# Patient Record
Sex: MALE | Race: WHITE | NOT HISPANIC OR LATINO | Employment: FULL TIME | URBAN - METROPOLITAN AREA
[De-identification: names, ages, dates, MRNs, and addresses within clinical notes are randomized per-mention and may not be internally consistent; named-entity substitution may affect disease eponyms.]

---

## 2021-04-22 DIAGNOSIS — Z00.00 ROUTINE GENERAL MEDICAL EXAMINATION AT A HEALTH CARE FACILITY: Primary | ICD-10-CM

## 2021-04-22 DIAGNOSIS — Z91.89 AT RISK FOR CORONARY ARTERY DISEASE: ICD-10-CM

## 2021-05-07 DIAGNOSIS — Z00.00 ROUTINE GENERAL MEDICAL EXAMINATION AT A HEALTH CARE FACILITY: ICD-10-CM

## 2021-05-07 DIAGNOSIS — Z91.89 AT RISK FOR CORONARY ARTERY DISEASE: Primary | ICD-10-CM

## 2021-05-12 ENCOUNTER — OFFICE VISIT (OUTPATIENT)
Dept: INTERNAL MEDICINE | Facility: CLINIC | Age: 41
End: 2021-05-12

## 2021-05-12 ENCOUNTER — HOSPITAL ENCOUNTER (OUTPATIENT)
Dept: RADIOLOGY | Facility: HOSPITAL | Age: 41
Discharge: HOME | End: 2021-05-12
Attending: FAMILY MEDICINE

## 2021-05-12 ENCOUNTER — HOSPITAL ENCOUNTER (OUTPATIENT)
Dept: CARDIOLOGY | Facility: HOSPITAL | Age: 41
Discharge: HOME | End: 2021-05-12
Attending: FAMILY MEDICINE

## 2021-05-12 ENCOUNTER — CLINICAL SUPPORT (OUTPATIENT)
Dept: AUDIOLOGY | Facility: CLINIC | Age: 41
End: 2021-05-12

## 2021-05-12 ENCOUNTER — CLINICAL SUPPORT (OUTPATIENT)
Dept: INTERNAL MEDICINE | Facility: CLINIC | Age: 41
End: 2021-05-12

## 2021-05-12 ENCOUNTER — CLINICAL SUPPORT (OUTPATIENT)
Dept: INTERNAL MEDICINE | Facility: CLINIC | Age: 41
End: 2021-05-12
Payer: COMMERCIAL

## 2021-05-12 ENCOUNTER — CLINICAL SUPPORT (OUTPATIENT)
Dept: REHABILITATION | Facility: HOSPITAL | Age: 41
End: 2021-05-12

## 2021-05-12 VITALS
DIASTOLIC BLOOD PRESSURE: 82 MMHG | SYSTOLIC BLOOD PRESSURE: 128 MMHG | RESPIRATION RATE: 16 BRPM | BODY MASS INDEX: 25.25 KG/M2 | HEIGHT: 70 IN | WEIGHT: 176.38 LBS | HEART RATE: 55 BPM

## 2021-05-12 DIAGNOSIS — Z01.12 HEARING CONSERVATION AND TREATMENT EXAM: Primary | ICD-10-CM

## 2021-05-12 DIAGNOSIS — Z00.00 ROUTINE GENERAL MEDICAL EXAMINATION AT A HEALTH CARE FACILITY: Primary | ICD-10-CM

## 2021-05-12 DIAGNOSIS — Z00.00 ROUTINE GENERAL MEDICAL EXAMINATION AT A HEALTH CARE FACILITY: ICD-10-CM

## 2021-05-12 DIAGNOSIS — M1A.0790 IDIOPATHIC CHRONIC GOUT OF FOOT WITHOUT TOPHUS, UNSPECIFIED LATERALITY: ICD-10-CM

## 2021-05-12 DIAGNOSIS — Z91.89 AT RISK FOR CORONARY ARTERY DISEASE: ICD-10-CM

## 2021-05-12 LAB
ALBUMIN SERPL BCP-MCNC: 3.9 G/DL (ref 3.5–5.2)
ALP SERPL-CCNC: 63 U/L (ref 55–135)
ALT SERPL W/O P-5'-P-CCNC: 22 U/L (ref 10–44)
ANION GAP SERPL CALC-SCNC: 10 MMOL/L (ref 8–16)
AST SERPL-CCNC: 27 U/L (ref 10–40)
BILIRUB SERPL-MCNC: 0.9 MG/DL (ref 0.1–1)
BILIRUB UR QL STRIP: NEGATIVE
BUN SERPL-MCNC: 15 MG/DL (ref 6–20)
CALCIUM SERPL-MCNC: 9.2 MG/DL (ref 8.7–10.5)
CHLORIDE SERPL-SCNC: 107 MMOL/L (ref 95–110)
CHOLEST SERPL-MCNC: 216 MG/DL (ref 120–199)
CHOLEST/HDLC SERPL: 4.1 {RATIO} (ref 2–5)
CLARITY UR: CLEAR
CO2 SERPL-SCNC: 25 MMOL/L (ref 23–29)
COLOR UR: YELLOW
CREAT SERPL-MCNC: 1.5 MG/DL (ref 0.5–1.4)
CRP SERPL-MCNC: 0.4 MG/L (ref 0–8.2)
CV STRESS BASE HR: 59 BPM
DIASTOLIC BLOOD PRESSURE: 85 MMHG
ERYTHROCYTE [DISTWIDTH] IN BLOOD BY AUTOMATED COUNT: 13.1 % (ref 11.5–14.5)
EST. GFR  (AFRICAN AMERICAN): >60 ML/MIN/1.73 M^2
EST. GFR  (NON AFRICAN AMERICAN): 57 ML/MIN/1.73 M^2
ESTIMATED AVG GLUCOSE: 97 MG/DL (ref 68–131)
GLUCOSE SERPL-MCNC: 92 MG/DL (ref 70–110)
GLUCOSE UR QL STRIP: NEGATIVE
HBA1C MFR BLD: 5 % (ref 4–5.6)
HCT VFR BLD AUTO: 46.2 % (ref 40–54)
HDLC SERPL-MCNC: 53 MG/DL (ref 40–75)
HDLC SERPL: 24.5 % (ref 20–50)
HGB BLD-MCNC: 16.4 G/DL (ref 14–18)
HGB UR QL STRIP: NEGATIVE
KETONES UR QL STRIP: NEGATIVE
LDLC SERPL CALC-MCNC: 134.6 MG/DL (ref 63–159)
LEUKOCYTE ESTERASE UR QL STRIP: NEGATIVE
MAGNESIUM SERPL-MCNC: 1.9 MG/DL (ref 1.6–2.6)
MCH RBC QN AUTO: 32.5 PG (ref 27–31)
MCHC RBC AUTO-ENTMCNC: 35.5 G/DL (ref 32–36)
MCV RBC AUTO: 92 FL (ref 82–98)
NITRITE UR QL STRIP: NEGATIVE
NONHDLC SERPL-MCNC: 163 MG/DL
OHS CV CPX 1 MINUTE RECOVERY HEART RATE: 131 BPM
OHS CV CPX 85 PERCENT MAX PREDICTED HEART RATE MALE: 153
OHS CV CPX ESTIMATED METS: 13
OHS CV CPX MAX PREDICTED HEART RATE: 180
OHS CV CPX PATIENT IS FEMALE: 0
OHS CV CPX PATIENT IS MALE: 1
OHS CV CPX PEAK DIASTOLIC BLOOD PRESSURE: 79 MMHG
OHS CV CPX PEAK HEAR RATE: 157 BPM
OHS CV CPX PEAK RATE PRESSURE PRODUCT: NORMAL
OHS CV CPX PEAK SYSTOLIC BLOOD PRESSURE: 244 MMHG
OHS CV CPX PERCENT MAX PREDICTED HEART RATE ACHIEVED: 87
OHS CV CPX RATE PRESSURE PRODUCT PRESENTING: 6962
PH UR STRIP: 6 [PH] (ref 5–8)
PLATELET # BLD AUTO: 201 K/UL (ref 150–450)
PMV BLD AUTO: 10.1 FL (ref 9.2–12.9)
POTASSIUM SERPL-SCNC: 4.9 MMOL/L (ref 3.5–5.1)
PROT SERPL-MCNC: 7 G/DL (ref 6–8.4)
PROT UR QL STRIP: NEGATIVE
RBC # BLD AUTO: 5.05 M/UL (ref 4.6–6.2)
SODIUM SERPL-SCNC: 142 MMOL/L (ref 136–145)
SP GR UR STRIP: 1.02 (ref 1–1.03)
STRESS ECHO POST EXERCISE DUR MIN: 12 MINUTES
SYSTOLIC BLOOD PRESSURE: 118 MMHG
TRIGL SERPL-MCNC: 142 MG/DL (ref 30–150)
TSH SERPL DL<=0.005 MIU/L-ACNC: 1.38 UIU/ML (ref 0.4–4)
URATE SERPL-MCNC: 10.4 MG/DL (ref 3.4–7)
URN SPEC COLLECT METH UR: NORMAL
WBC # BLD AUTO: 4.99 K/UL (ref 3.9–12.7)

## 2021-05-12 PROCEDURE — 97802 PR MED NUTR THER, 1ST, INDIV, EA 15 MIN: ICD-10-PCS | Mod: S$GLB,,, | Performed by: INTERNAL MEDICINE

## 2021-05-12 PROCEDURE — 93016 EXERCISE STRESS - EKG (CUPID ONLY): ICD-10-PCS | Mod: ,,, | Performed by: INTERNAL MEDICINE

## 2021-05-12 PROCEDURE — 86140 C-REACTIVE PROTEIN: CPT | Performed by: FAMILY MEDICINE

## 2021-05-12 PROCEDURE — 82977 ASSAY OF GGT: CPT | Performed by: FAMILY MEDICINE

## 2021-05-12 PROCEDURE — 80061 LIPID PANEL: CPT | Performed by: FAMILY MEDICINE

## 2021-05-12 PROCEDURE — 99999 PR PBB SHADOW E&M-EST. PATIENT-LVL II: CPT | Mod: PBBFAC,,, | Performed by: FAMILY MEDICINE

## 2021-05-12 PROCEDURE — 83036 HEMOGLOBIN GLYCOSYLATED A1C: CPT | Performed by: FAMILY MEDICINE

## 2021-05-12 PROCEDURE — 85027 COMPLETE CBC AUTOMATED: CPT | Performed by: FAMILY MEDICINE

## 2021-05-12 PROCEDURE — 71046 XR CHEST PA AND LATERAL: ICD-10-PCS | Mod: 26,,, | Performed by: RADIOLOGY

## 2021-05-12 PROCEDURE — 82728 ASSAY OF FERRITIN: CPT | Performed by: FAMILY MEDICINE

## 2021-05-12 PROCEDURE — 86703 HIV-1/HIV-2 1 RESULT ANTBDY: CPT | Performed by: FAMILY MEDICINE

## 2021-05-12 PROCEDURE — 82607 VITAMIN B-12: CPT | Performed by: FAMILY MEDICINE

## 2021-05-12 PROCEDURE — 80053 COMPREHEN METABOLIC PANEL: CPT | Performed by: FAMILY MEDICINE

## 2021-05-12 PROCEDURE — 75571 CT CALCIUM SCORING CARDIAC: ICD-10-PCS | Mod: 26,,, | Performed by: RADIOLOGY

## 2021-05-12 PROCEDURE — 92552 PURE TONE AUDIOMETRY AIR: CPT | Mod: S$GLB,,, | Performed by: AUDIOLOGIST-HEARING AID FITTER

## 2021-05-12 PROCEDURE — 84443 ASSAY THYROID STIM HORMONE: CPT | Performed by: FAMILY MEDICINE

## 2021-05-12 PROCEDURE — 92552 PR PURE TONE AUDIOMETRY, AIR: ICD-10-PCS | Mod: S$GLB,,, | Performed by: AUDIOLOGIST-HEARING AID FITTER

## 2021-05-12 PROCEDURE — 82172 ASSAY OF APOLIPOPROTEIN: CPT | Performed by: FAMILY MEDICINE

## 2021-05-12 PROCEDURE — 83695 ASSAY OF LIPOPROTEIN(A): CPT | Performed by: FAMILY MEDICINE

## 2021-05-12 PROCEDURE — 71046 X-RAY EXAM CHEST 2 VIEWS: CPT | Mod: 26,,, | Performed by: RADIOLOGY

## 2021-05-12 PROCEDURE — 82040 ASSAY OF SERUM ALBUMIN: CPT | Performed by: FAMILY MEDICINE

## 2021-05-12 PROCEDURE — 99386 PR PREVENTIVE VISIT,NEW,40-64: ICD-10-PCS | Mod: S$GLB,,, | Performed by: FAMILY MEDICINE

## 2021-05-12 PROCEDURE — 82570 ASSAY OF URINE CREATININE: CPT | Performed by: FAMILY MEDICINE

## 2021-05-12 PROCEDURE — 84550 ASSAY OF BLOOD/URIC ACID: CPT | Performed by: FAMILY MEDICINE

## 2021-05-12 PROCEDURE — 83735 ASSAY OF MAGNESIUM: CPT | Performed by: FAMILY MEDICINE

## 2021-05-12 PROCEDURE — 99999 PR PBB SHADOW E&M-EST. PATIENT-LVL II: ICD-10-PCS | Mod: PBBFAC,,, | Performed by: FAMILY MEDICINE

## 2021-05-12 PROCEDURE — 99386 PREV VISIT NEW AGE 40-64: CPT | Mod: S$GLB,,, | Performed by: FAMILY MEDICINE

## 2021-05-12 PROCEDURE — 97802 MEDICAL NUTRITION INDIV IN: CPT | Mod: S$GLB,,, | Performed by: INTERNAL MEDICINE

## 2021-05-12 PROCEDURE — 75571 CT HRT W/O DYE W/CA TEST: CPT | Mod: TC

## 2021-05-12 PROCEDURE — 82306 VITAMIN D 25 HYDROXY: CPT | Performed by: FAMILY MEDICINE

## 2021-05-12 PROCEDURE — 81003 URINALYSIS AUTO W/O SCOPE: CPT | Performed by: FAMILY MEDICINE

## 2021-05-12 PROCEDURE — 71046 X-RAY EXAM CHEST 2 VIEWS: CPT | Mod: TC

## 2021-05-12 PROCEDURE — 93018 EXERCISE STRESS - EKG (CUPID ONLY): ICD-10-PCS | Mod: ,,, | Performed by: INTERNAL MEDICINE

## 2021-05-12 PROCEDURE — 93018 CV STRESS TEST I&R ONLY: CPT | Mod: ,,, | Performed by: INTERNAL MEDICINE

## 2021-05-12 PROCEDURE — 82043 UR ALBUMIN QUANTITATIVE: CPT | Performed by: FAMILY MEDICINE

## 2021-05-12 PROCEDURE — 83540 ASSAY OF IRON: CPT | Performed by: FAMILY MEDICINE

## 2021-05-12 PROCEDURE — 75571 CT HRT W/O DYE W/CA TEST: CPT | Mod: 26,,, | Performed by: RADIOLOGY

## 2021-05-12 PROCEDURE — 93016 CV STRESS TEST SUPVJ ONLY: CPT | Mod: ,,, | Performed by: INTERNAL MEDICINE

## 2021-05-12 PROCEDURE — 97750 PHYSICAL PERFORMANCE TEST: CPT | Performed by: PHYSICAL THERAPIST

## 2021-05-12 RX ORDER — COLCHICINE 0.6 MG/1
TABLET ORAL
Qty: 6 TABLET | Refills: 3 | Status: SHIPPED | OUTPATIENT
Start: 2021-05-12 | End: 2022-11-07 | Stop reason: SDUPTHER

## 2021-05-12 RX ORDER — ALLOPURINOL 300 MG/1
300 TABLET ORAL DAILY
Qty: 90 TABLET | Refills: 3 | Status: SHIPPED | OUTPATIENT
Start: 2021-05-12 | End: 2022-05-12

## 2021-05-13 LAB
25(OH)D3+25(OH)D2 SERPL-MCNC: 19 NG/ML (ref 30–96)
ALBUMIN SERPL BCP-MCNC: 3.9 G/DL (ref 3.5–5.2)
ALBUMIN/CREAT UR: 9.9 UG/MG (ref 0–30)
CREAT UR-MCNC: 192 MG/DL (ref 23–375)
FERRITIN SERPL-MCNC: 153 NG/ML (ref 20–300)
GGT SERPL-CCNC: 25 U/L (ref 8–55)
HIV 1+2 AB+HIV1 P24 AG SERPL QL IA: NEGATIVE
IRON SERPL-MCNC: 198 UG/DL (ref 45–160)
MICROALBUMIN UR DL<=1MG/L-MCNC: 19 UG/ML
SATURATED IRON: 51 % (ref 20–50)
TOTAL IRON BINDING CAPACITY: 388 UG/DL (ref 250–450)
TRANSFERRIN SERPL-MCNC: 262 MG/DL (ref 200–375)
VIT B12 SERPL-MCNC: 290 PG/ML (ref 210–950)

## 2021-05-14 LAB — APO B SERPL-MCNC: 118 MG/DL

## 2021-05-17 ENCOUNTER — TELEPHONE (OUTPATIENT)
Dept: INTERNAL MEDICINE | Facility: CLINIC | Age: 41
End: 2021-05-17

## 2021-05-18 LAB — LPA SERPL-MCNC: 17 MG/DL (ref 0–30)

## 2021-06-03 PROBLEM — M1A.0790 IDIOPATHIC CHRONIC GOUT OF FOOT WITHOUT TOPHUS: Status: ACTIVE | Noted: 2021-06-03

## 2022-04-21 DIAGNOSIS — Z00.00 ROUTINE GENERAL MEDICAL EXAMINATION AT A HEALTH CARE FACILITY: Primary | ICD-10-CM

## 2022-05-08 DIAGNOSIS — M1A.0790 IDIOPATHIC CHRONIC GOUT OF FOOT WITHOUT TOPHUS, UNSPECIFIED LATERALITY: ICD-10-CM

## 2022-05-08 RX ORDER — ALLOPURINOL 300 MG/1
TABLET ORAL
Refills: 0 | OUTPATIENT
Start: 2022-05-08

## 2022-05-08 NOTE — TELEPHONE ENCOUNTER
[facility] Note  Quick DC. Inappropriate Request   Refill request requires further review by MD: NO   Medication Therapy Plan: Pharmacy is requesting new script(s) for the following medications without required information, (sig/ frequency/qty/etc)     [facility] action(s):  Quick Discontinue      Duplicate Pended Encounter(s)/ Last Prescribed Details:    Pharmacies have been requesting medications for patients without required information, (sig, frequency, qty, etc.). In addition, requests are sent for medication(s) pt. are currently not taking, and medications patients have never taken.    We have spoken to the pharmacies about these request types and advised their teams previously that we are unable to assess these New Script requests and require all details for these requests. This is a known issue and has been reported.        Medication related problems are not assessed for QDC.   Medication Reconciliation Completed? NO Were there pending details that required adjustment? NO     Automatic Epic Generated Protocol Data Below:   Requested Prescriptions   Pending Prescriptions Disp Refills    allopurinoL (ZYLOPRIM) 300 MG tablet [Pharmacy Med Name: ALLOPURINOL TABS 300MG]  0              Appointments      Date Provider   Last Visit   5/12/2021 Eliud Bay MD   Next Visit   6/27/2022 Eliud Bay MD        Note composed:12:11 PM 05/08/2022

## 2022-05-08 NOTE — TELEPHONE ENCOUNTER
Care Due:                  Date            Visit Type   Department     Provider  --------------------------------------------------------------------------------                                ADMIT                               REVIEW EXEC   [facility] INTERNAL  Last Visit: 05-      CHECK        MEDICINE       Eliud Bay                              ADMIT                               REVIEW EXEC   [facility] INTERNAL  Next Visit: 06-      CHECK        MEDICINE       Eliud Bay                                                            Last  Test          Frequency    Reason                     Performed    Due Date  --------------------------------------------------------------------------------    CBC.........  12 months..  allopurinoL..............  05- 05-    CMP.........  12 months..  allopurinoL, colchicine..  05- 05-    Uric Acid...  12 months..  allopurinoL, colchicine..  05- 05-    [facility] Embedded Care Gaps. Reference number: [id number]. 5/08/2022   7:25:53 AM CDT

## 2022-06-27 ENCOUNTER — CLINICAL SUPPORT (OUTPATIENT)
Dept: INTERNAL MEDICINE | Facility: CLINIC | Age: 42
End: 2022-06-27

## 2022-06-27 ENCOUNTER — OFFICE VISIT (OUTPATIENT)
Dept: INTERNAL MEDICINE | Facility: CLINIC | Age: 42
End: 2022-06-27

## 2022-06-27 ENCOUNTER — HOSPITAL ENCOUNTER (OUTPATIENT)
Dept: RADIOLOGY | Facility: HOSPITAL | Age: 42
Discharge: HOME | End: 2022-06-27
Attending: FAMILY MEDICINE

## 2022-06-27 ENCOUNTER — HOSPITAL ENCOUNTER (OUTPATIENT)
Dept: CARDIOLOGY | Facility: HOSPITAL | Age: 42
Discharge: HOME | End: 2022-06-27
Attending: FAMILY MEDICINE

## 2022-06-27 ENCOUNTER — CLINICAL SUPPORT (OUTPATIENT)
Dept: REHABILITATION | Facility: HOSPITAL | Age: 42
End: 2022-06-27
Attending: FAMILY MEDICINE

## 2022-06-27 ENCOUNTER — CLINICAL SUPPORT (OUTPATIENT)
Dept: AUDIOLOGY | Facility: CLINIC | Age: 42
End: 2022-06-27

## 2022-06-27 VITALS
HEART RATE: 66 BPM | SYSTOLIC BLOOD PRESSURE: 136 MMHG | WEIGHT: 180.75 LBS | TEMPERATURE: 97 F | BODY MASS INDEX: 25.88 KG/M2 | DIASTOLIC BLOOD PRESSURE: 83 MMHG | HEIGHT: 70 IN

## 2022-06-27 DIAGNOSIS — Z00.00 ROUTINE GENERAL MEDICAL EXAMINATION AT A HEALTH CARE FACILITY: Primary | ICD-10-CM

## 2022-06-27 DIAGNOSIS — Z00.00 ROUTINE GENERAL MEDICAL EXAMINATION AT A HEALTH CARE FACILITY: ICD-10-CM

## 2022-06-27 DIAGNOSIS — M1A.0790 IDIOPATHIC CHRONIC GOUT OF FOOT WITHOUT TOPHUS, UNSPECIFIED LATERALITY: ICD-10-CM

## 2022-06-27 DIAGNOSIS — Z01.12 HEARING CONSERVATION AND TREATMENT EXAM: Primary | ICD-10-CM

## 2022-06-27 PROBLEM — I10 HYPERTENSION: Status: ACTIVE | Noted: 2022-04-28

## 2022-06-27 LAB
ALBUMIN SERPL BCP-MCNC: 3.7 G/DL (ref 3.5–5.2)
ALBUMIN/CREAT UR: 4.2 UG/MG (ref 0–30)
ALP SERPL-CCNC: 82 U/L (ref 55–135)
ALT SERPL W/O P-5'-P-CCNC: 41 U/L (ref 10–44)
ANION GAP SERPL CALC-SCNC: 12 MMOL/L (ref 8–16)
AST SERPL-CCNC: 32 U/L (ref 10–40)
BASOPHILS # BLD AUTO: 0.06 K/UL (ref 0–0.2)
BASOPHILS NFR BLD: 0.7 % (ref 0–1.9)
BILIRUB SERPL-MCNC: 0.7 MG/DL (ref 0.1–1)
BILIRUB UR QL STRIP: NEGATIVE
BUN SERPL-MCNC: 18 MG/DL (ref 6–20)
CALCIUM SERPL-MCNC: 8.8 MG/DL (ref 8.7–10.5)
CHLORIDE SERPL-SCNC: 105 MMOL/L (ref 95–110)
CHOLEST SERPL-MCNC: 237 MG/DL (ref 120–199)
CHOLEST/HDLC SERPL: 3.4 {RATIO} (ref 2–5)
CLARITY UR: CLEAR
CO2 SERPL-SCNC: 22 MMOL/L (ref 23–29)
COLOR UR: YELLOW
COMPLEXED PSA SERPL-MCNC: 0.45 NG/ML (ref 0–4)
CREAT SERPL-MCNC: 1.3 MG/DL (ref 0.5–1.4)
CREAT UR-MCNC: 144 MG/DL (ref 23–375)
CRP SERPL-MCNC: 5.9 MG/L (ref 0–8.2)
CV STRESS BASE HR: 63 BPM
DIASTOLIC BLOOD PRESSURE: 84 MMHG
DIFFERENTIAL METHOD: ABNORMAL
EOSINOPHIL # BLD AUTO: 0.3 K/UL (ref 0–0.5)
EOSINOPHIL NFR BLD: 3 % (ref 0–8)
ERYTHROCYTE [DISTWIDTH] IN BLOOD BY AUTOMATED COUNT: 13.7 % (ref 11.5–14.5)
EST. GFR  (AFRICAN AMERICAN): >60 ML/MIN/1.73 M^2
EST. GFR  (NON AFRICAN AMERICAN): >60 ML/MIN/1.73 M^2
ESTIMATED AVG GLUCOSE: 100 MG/DL (ref 68–131)
FERRITIN SERPL-MCNC: 139 NG/ML (ref 20–300)
GGT SERPL-CCNC: 44 U/L (ref 8–55)
GLUCOSE SERPL-MCNC: 85 MG/DL (ref 70–110)
GLUCOSE UR QL STRIP: NEGATIVE
HBA1C MFR BLD: 5.1 % (ref 4–5.6)
HCT VFR BLD AUTO: 47.9 % (ref 40–54)
HDLC SERPL-MCNC: 70 MG/DL (ref 40–75)
HDLC SERPL: 29.5 % (ref 20–50)
HGB BLD-MCNC: 16.7 G/DL (ref 14–18)
HGB UR QL STRIP: NEGATIVE
IMM GRANULOCYTES # BLD AUTO: 0.2 K/UL (ref 0–0.04)
IMM GRANULOCYTES NFR BLD AUTO: 2.3 % (ref 0–0.5)
IRON SERPL-MCNC: 139 UG/DL (ref 45–160)
KETONES UR QL STRIP: NEGATIVE
LDLC SERPL CALC-MCNC: 142.2 MG/DL (ref 63–159)
LEUKOCYTE ESTERASE UR QL STRIP: NEGATIVE
LYMPHOCYTES # BLD AUTO: 1.5 K/UL (ref 1–4.8)
LYMPHOCYTES NFR BLD: 17.8 % (ref 18–48)
MAGNESIUM SERPL-MCNC: 1.9 MG/DL (ref 1.6–2.6)
MCH RBC QN AUTO: 32.9 PG (ref 27–31)
MCHC RBC AUTO-ENTMCNC: 34.9 G/DL (ref 32–36)
MCV RBC AUTO: 95 FL (ref 82–98)
MICROALBUMIN UR DL<=1MG/L-MCNC: 6 UG/ML
MONOCYTES # BLD AUTO: 0.6 K/UL (ref 0.3–1)
MONOCYTES NFR BLD: 7.4 % (ref 4–15)
NEUTROPHILS # BLD AUTO: 5.9 K/UL (ref 1.8–7.7)
NEUTROPHILS NFR BLD: 68.8 % (ref 38–73)
NITRITE UR QL STRIP: NEGATIVE
NONHDLC SERPL-MCNC: 167 MG/DL
NRBC BLD-RTO: 0 /100 WBC
OHS CV CPX 1 MINUTE RECOVERY HEART RATE: 144 BPM
OHS CV CPX 85 PERCENT MAX PREDICTED HEART RATE MALE: 152
OHS CV CPX ESTIMATED METS: 15
OHS CV CPX MAX PREDICTED HEART RATE: 179
OHS CV CPX PATIENT IS FEMALE: 0
OHS CV CPX PATIENT IS MALE: 1
OHS CV CPX PEAK DIASTOLIC BLOOD PRESSURE: 92 MMHG
OHS CV CPX PEAK HEAR RATE: 155 BPM
OHS CV CPX PEAK RATE PRESSURE PRODUCT: NORMAL
OHS CV CPX PEAK SYSTOLIC BLOOD PRESSURE: 221 MMHG
OHS CV CPX PERCENT MAX PREDICTED HEART RATE ACHIEVED: 87
OHS CV CPX RATE PRESSURE PRODUCT PRESENTING: 7875
PH UR STRIP: 6 [PH] (ref 5–8)
PLATELET # BLD AUTO: 216 K/UL (ref 150–450)
PMV BLD AUTO: 10.6 FL (ref 9.2–12.9)
POTASSIUM SERPL-SCNC: 3.9 MMOL/L (ref 3.5–5.1)
PROT SERPL-MCNC: 7 G/DL (ref 6–8.4)
PROT UR QL STRIP: NEGATIVE
RBC # BLD AUTO: 5.07 M/UL (ref 4.6–6.2)
SATURATED IRON: 31 % (ref 20–50)
SODIUM SERPL-SCNC: 139 MMOL/L (ref 136–145)
SP GR UR STRIP: 1.02 (ref 1–1.03)
STRESS ECHO POST EXERCISE DUR MIN: 12 MINUTES
STRESS ECHO POST EXERCISE DUR SEC: 38 SECONDS
SYSTOLIC BLOOD PRESSURE: 125 MMHG
TOTAL IRON BINDING CAPACITY: 443 UG/DL (ref 250–450)
TRANSFERRIN SERPL-MCNC: 299 MG/DL (ref 200–375)
TRIGL SERPL-MCNC: 124 MG/DL (ref 30–150)
TSH SERPL DL<=0.005 MIU/L-ACNC: 0.99 UIU/ML (ref 0.4–4)
URATE SERPL-MCNC: 6 MG/DL (ref 3.4–7)
URN SPEC COLLECT METH UR: NORMAL
VIT B12 SERPL-MCNC: 273 PG/ML (ref 210–950)
WBC # BLD AUTO: 8.61 K/UL (ref 3.9–12.7)

## 2022-06-27 PROCEDURE — 84153 ASSAY OF PSA TOTAL: CPT | Performed by: FAMILY MEDICINE

## 2022-06-27 PROCEDURE — 93016 EXERCISE STRESS - EKG (CUPID ONLY): ICD-10-PCS | Mod: ,,, | Performed by: INTERNAL MEDICINE

## 2022-06-27 PROCEDURE — 97750 PHYSICAL PERFORMANCE TEST: CPT | Performed by: PHYSICAL THERAPIST

## 2022-06-27 PROCEDURE — 99396 PR PREVENTIVE VISIT,EST,40-64: ICD-10-PCS | Mod: S$GLB,,, | Performed by: FAMILY MEDICINE

## 2022-06-27 PROCEDURE — 82043 UR ALBUMIN QUANTITATIVE: CPT | Performed by: FAMILY MEDICINE

## 2022-06-27 PROCEDURE — 83735 ASSAY OF MAGNESIUM: CPT | Performed by: FAMILY MEDICINE

## 2022-06-27 PROCEDURE — 99396 PREV VISIT EST AGE 40-64: CPT | Mod: S$GLB,,, | Performed by: FAMILY MEDICINE

## 2022-06-27 PROCEDURE — 71046 X-RAY EXAM CHEST 2 VIEWS: CPT | Mod: TC

## 2022-06-27 PROCEDURE — 83036 HEMOGLOBIN GLYCOSYLATED A1C: CPT | Performed by: FAMILY MEDICINE

## 2022-06-27 PROCEDURE — 86140 C-REACTIVE PROTEIN: CPT | Performed by: FAMILY MEDICINE

## 2022-06-27 PROCEDURE — 99999 PR PBB SHADOW E&M-EST. PATIENT-LVL I: CPT | Mod: PBBFAC,,, | Performed by: AUDIOLOGIST-HEARING AID FITTER

## 2022-06-27 PROCEDURE — 75571 CT CALCIUM SCORING CARDIAC: ICD-10-PCS | Mod: 26,,, | Performed by: RADIOLOGY

## 2022-06-27 PROCEDURE — 80053 COMPREHEN METABOLIC PANEL: CPT | Performed by: FAMILY MEDICINE

## 2022-06-27 PROCEDURE — 92552 PURE TONE AUDIOMETRY AIR: CPT | Mod: S$GLB,,, | Performed by: AUDIOLOGIST-HEARING AID FITTER

## 2022-06-27 PROCEDURE — 80061 LIPID PANEL: CPT | Performed by: FAMILY MEDICINE

## 2022-06-27 PROCEDURE — 97802 MEDICAL NUTRITION INDIV IN: CPT | Mod: S$GLB,,, | Performed by: INTERNAL MEDICINE

## 2022-06-27 PROCEDURE — 82172 ASSAY OF APOLIPOPROTEIN: CPT | Performed by: FAMILY MEDICINE

## 2022-06-27 PROCEDURE — 75571 CT HRT W/O DYE W/CA TEST: CPT | Mod: 26,,, | Performed by: RADIOLOGY

## 2022-06-27 PROCEDURE — 93017 CV STRESS TEST TRACING ONLY: CPT

## 2022-06-27 PROCEDURE — 84443 ASSAY THYROID STIM HORMONE: CPT | Performed by: FAMILY MEDICINE

## 2022-06-27 PROCEDURE — 82570 ASSAY OF URINE CREATININE: CPT | Performed by: FAMILY MEDICINE

## 2022-06-27 PROCEDURE — 81003 URINALYSIS AUTO W/O SCOPE: CPT | Performed by: FAMILY MEDICINE

## 2022-06-27 PROCEDURE — 83695 ASSAY OF LIPOPROTEIN(A): CPT | Performed by: FAMILY MEDICINE

## 2022-06-27 PROCEDURE — 93018 EXERCISE STRESS - EKG (CUPID ONLY): ICD-10-PCS | Mod: ,,, | Performed by: INTERNAL MEDICINE

## 2022-06-27 PROCEDURE — 99999 PR PBB SHADOW E&M-EST. PATIENT-LVL I: ICD-10-PCS | Mod: PBBFAC,,, | Performed by: AUDIOLOGIST-HEARING AID FITTER

## 2022-06-27 PROCEDURE — 85025 COMPLETE CBC W/AUTO DIFF WBC: CPT | Performed by: FAMILY MEDICINE

## 2022-06-27 PROCEDURE — 84466 ASSAY OF TRANSFERRIN: CPT | Performed by: FAMILY MEDICINE

## 2022-06-27 PROCEDURE — 82977 ASSAY OF GGT: CPT | Performed by: FAMILY MEDICINE

## 2022-06-27 PROCEDURE — 75571 CT HRT W/O DYE W/CA TEST: CPT | Mod: TC

## 2022-06-27 PROCEDURE — 99999 PR PBB SHADOW E&M-EST. PATIENT-LVL III: CPT | Mod: PBBFAC,,, | Performed by: FAMILY MEDICINE

## 2022-06-27 PROCEDURE — 84550 ASSAY OF BLOOD/URIC ACID: CPT | Performed by: FAMILY MEDICINE

## 2022-06-27 PROCEDURE — 93018 CV STRESS TEST I&R ONLY: CPT | Mod: ,,, | Performed by: INTERNAL MEDICINE

## 2022-06-27 PROCEDURE — 71046 XR CHEST PA AND LATERAL: ICD-10-PCS | Mod: 26,,, | Performed by: RADIOLOGY

## 2022-06-27 PROCEDURE — 82607 VITAMIN B-12: CPT | Performed by: FAMILY MEDICINE

## 2022-06-27 PROCEDURE — 82728 ASSAY OF FERRITIN: CPT | Performed by: FAMILY MEDICINE

## 2022-06-27 PROCEDURE — 99999 PR PBB SHADOW E&M-EST. PATIENT-LVL III: ICD-10-PCS | Mod: PBBFAC,,, | Performed by: FAMILY MEDICINE

## 2022-06-27 PROCEDURE — 93016 CV STRESS TEST SUPVJ ONLY: CPT | Mod: ,,, | Performed by: INTERNAL MEDICINE

## 2022-06-27 PROCEDURE — 97802 PR MED NUTR THER, 1ST, INDIV, EA 15 MIN: ICD-10-PCS | Mod: S$GLB,,, | Performed by: INTERNAL MEDICINE

## 2022-06-27 PROCEDURE — 92552 PR PURE TONE AUDIOMETRY, AIR: ICD-10-PCS | Mod: S$GLB,,, | Performed by: AUDIOLOGIST-HEARING AID FITTER

## 2022-06-27 PROCEDURE — 71046 X-RAY EXAM CHEST 2 VIEWS: CPT | Mod: 26,,, | Performed by: RADIOLOGY

## 2022-06-27 PROCEDURE — 87389 HIV-1 AG W/HIV-1&-2 AB AG IA: CPT | Performed by: FAMILY MEDICINE

## 2022-06-27 RX ORDER — ALLOPURINOL 300 MG/1
TABLET ORAL
COMMUNITY
Start: 2022-01-03 | End: 2022-06-27 | Stop reason: SDUPTHER

## 2022-06-27 RX ORDER — ALLOPURINOL 300 MG/1
300 TABLET ORAL DAILY
Qty: 90 TABLET | Refills: 3 | Status: SHIPPED | OUTPATIENT
Start: 2022-06-27 | End: 2023-07-23

## 2022-06-27 RX ORDER — LORATADINE 10 MG/1
TABLET ORAL
COMMUNITY
Start: 2022-03-28

## 2022-06-27 NOTE — PROGRESS NOTES
"Nutrition Assessment  Session Time:  30 minutes      Client name:  Jonathan Barnes  :  1980  Age:  41 y.o.  Gender:  male    Client states:  Present for annual physical.  Last physical was in May 2021.  Continues with same goal to decrease body fat percentage, but not making changes to achieve lower percentage.  Consistent with same nutrition and exercise routine from last year.  No questions or concerns today regarding current nutrition or health status.     2021: Client states:  [employer] employee present for physical with [employer]. Reports feeling relatively healthy. Pt with goal to decrease body fat percentage some. Food and exercise information provided below. Reports staying consistent with healthier eating habits on weekends and tends to be slightly relaxed on the weekends.          Anthropometrics  Height:  70"     Weight:  182 lbs   2021: 177 lbs  BMI:  26.2   2021: 25.4  % Body Fat:  17.00%   2021: 17.14%    Clinical Signs/Symptoms  N/V/D:  none  Appetite:  good       Past Medical History:   Diagnosis Date    Gout        Past Surgical History:   Procedure Laterality Date    ADENOIDECTOMY      REPAIR OF TYMPANIC MEMBRANE USING PATCH      TONSILLECTOMY         Medications    has a current medication list which includes the following prescription(s): colchicine.    Vitamins, Minerals, and/or Supplements:  cr     Food/Medication Interactions:  Reviewed     Food Allergies or Intolerances:  NKFA     Social History    Marital status:    Employment:  [employer]    Social History     Tobacco Use    Smoking status: Former Smoker     Types: Cigarettes     Quit date: 2000     Years since quittin.1    Smokeless tobacco: Never Used   Substance Use Topics    Alcohol use: Yes     Alcohol/week: 14.0 standard drinks     Types: 14 Cans of beer per week        Lab Reports   Sodium   Date Value Ref Range Status   2021 142 136 - 145 mmol/L Final "     Potassium   Date Value Ref Range Status   05/12/2021 4.9 3.5 - 5.1 mmol/L Final     Chloride   Date Value Ref Range Status   05/12/2021 107 95 - 110 mmol/L Final     CO2   Date Value Ref Range Status   05/12/2021 25 23 - 29 mmol/L Final     Glucose   Date Value Ref Range Status   05/12/2021 92 70 - 110 mg/dL Final     BUN   Date Value Ref Range Status   05/12/2021 15 6 - 20 mg/dL Final     Creatinine   Date Value Ref Range Status   05/12/2021 1.5 (H) 0.5 - 1.4 mg/dL Final     Calcium   Date Value Ref Range Status   05/12/2021 9.2 8.7 - 10.5 mg/dL Final     Total Protein   Date Value Ref Range Status   05/12/2021 7.0 6.0 - 8.4 g/dL Final     Albumin   Date Value Ref Range Status   05/12/2021 3.9 3.5 - 5.2 g/dL Final     Total Bilirubin   Date Value Ref Range Status   05/12/2021 0.9 0.1 - 1.0 mg/dL Final     Comment:     For infants and newborns, interpretation of results should be based  on gestational age, weight and in agreement with clinical  observations.    Premature Infant recommended reference ranges:  Up to 24 hours.............<8.0 mg/dL  Up to 48 hours............<12.0 mg/dL  3-5 days..................<15.0 mg/dL  6-29 days.................<15.0 mg/dL       Alkaline Phosphatase   Date Value Ref Range Status   05/12/2021 63 55 - 135 U/L Final     AST   Date Value Ref Range Status   05/12/2021 27 10 - 40 U/L Final     ALT   Date Value Ref Range Status   05/12/2021 22 10 - 44 U/L Final     Anion Gap   Date Value Ref Range Status   05/12/2021 10 8 - 16 mmol/L Final     eGFR if    Date Value Ref Range Status   05/12/2021 >60 >60 mL/min/1.73 m^2 Final     eGFR if non    Date Value Ref Range Status   05/12/2021 57 (A) >60 mL/min/1.73 m^2 Final     Comment:     Calculation used to obtain the estimated glomerular filtration  rate (eGFR) is the CKD-EPI equation.         Lab Results   Component Value Date    WBC 4.99 05/12/2021    HGB 16.4 05/12/2021    HCT 46.2 05/12/2021    MCV 92  05/12/2021     05/12/2021        Lab Results   Component Value Date    CHOL 216 (H) 05/12/2021     Lab Results   Component Value Date    HDL 53 05/12/2021     Lab Results   Component Value Date    LDLCALC 134.6 05/12/2021     Lab Results   Component Value Date    TRIG 142 05/12/2021     Lab Results   Component Value Date    CHOLHDL 24.5 05/12/2021     Lab Results   Component Value Date    HGBA1C 5.0 05/12/2021     BP Readings from Last 1 Encounters:   05/12/21 128/82       Food History  Breakfast:  Protein shake (isopure) + water   Lunch:  Salad machine (chicken, strawberries, egg, cheese, ranch)   Dinner:  Chicken breast + vegetable// turkey burger + vegetables  *Fluid intake:  Diet pepsi, beer (1 on week days, 2 on weekend days), water     Exercise History:  6-7 days weekly; cardio + weights (70%)    Cultural/Spiritual/Personal Preferences:  None identified    Support System:  family/friends    State of Change:  Precontemplation    Barriers to Change:  none    Diagnosis    Overweight related to excess energy intake as evidenced by BMI 26.    Intervention    RMR (Method:  Per fitness exam):  1741 kcal  Activity Factor:  1.4    PHOEBE:  2437 - 300 = 2137 kcal    Goals:  1.  Decrease alcohol intake.      Nutrition Education  The following education was provided to the patient:  Complimented patient on physical activity efforts.  Discussed weight management.  Suggested dietary modifications based on current dietary behaviors and individual food preferences.  *Lab results were pending at time of consult and so, not discussed with patient.    Patient verbalized understanding of nutrition education and recommendations received.    Handouts Provided  Meal Planning Guide  Restaurant Guide  Eat Fit Shopping List  Fueling Well On-The-Go  Plate Method  Healthy Snack List    Monitoring/Evaluation    Monitor the following:  Weight  BMI  % Body Fat  Caloric intake  Labs:  Lipid panel, glucose, HgbA1c    Follow Up Plan:   Communication with referring healthcare provider is unnecessary at this time as patient presented as part of annual wellness exam.  However, will follow up with patient in 1-2 years.

## 2022-06-27 NOTE — PROGRESS NOTES
:  [birth year]    DX: Z00.0  Orders:  Fitness Evaluation    Patient's 2nd [facility] Fitness Component evaluation was completed.  Results are as follows:    Height (in):    70                            Weight (lbs):    182                                    BMI:   26.2    Resting Energy Expenditure:         1741       kcal/24 hrs.                  Body Composition:          17.00  % body fat             Rating: excellent    Waist to Hip Ratio:     0.94                    Risk:  low   Hip taken over clothing:      Yes          Muscular Strength and Endurance Assessment:               Strength (lbs):     Right: 140             Rating:  Above average      Left:  123.3           Rating: average   Push-ups:   36                 Rating:  Excellent   (performed using 5# wts - hands/fingers feeling stiff L worse than R)   Curl-ups :   75                Rating:  Well above average    Flexibility Testing:   Sit and Reach (cm):    33                       Rating:  Excellent    Pt employed at [employer], continues to exercise on a regular basis ~ 6 days/week.  Cardio and resistance training.  Hands/fingers feeling stiff after yard work and digging in the dirt.     Date 2022   Height (in): 70 70   Weight: 182 177   BMI: 26.17 25.45   Body Fat %: 17.00 17.14   Waist (cm): 89.7 88.4   Hip (cm): 95.5 95.3   WHR: 0.94 0.93   RBP: 130/92 128/82   RHR: 68 55    Rt (lbs): 140 138    Lt (lbs): 123 133   Push-up  36 42   Curl-up  75 75   Flexibility  33 33   REE  (Kcals) 1741 1723         The patient completed the testing procedures without complications.

## 2022-06-27 NOTE — LETTER
June 27, 2022    Jonathan Barnes  [address] [place] [address]             [address]  [address] [facility] [address]  Phone: [phone]  Fax: [phone] Dear Mr. Barnes,    On June 27, 2022, it was a pleasure to see you again and perform your second [employer] evaluation. At the time of this visit, you are 41 years old.  You did not have any specific complaints or concerns during today's visit.     YOUR PAST MEDICAL HISTORY includes Gout and Allergic rhinosinusitis.    YOUR PAST SURGICAL HISTORY includes a Tympanoplasty, a Tonsillectomy, and an Adenoidectomy.    YOUR CURRENT MEDICATIONS include allopurinol 300 mg daily, Claritin 10 mg daily as needed, and Colcrys 0.6 mg tablets as needed for gout attacks    YOUR KNOWN DRUG ALLERGIES include Penicillin.    YOUR SOCIAL HISTORY includes employment by [employer]. You are  with an 12-year-old son. You quit smoking years ago. You drink approximately 2 beers a day. You denied any illicit drug use.    YOUR FAMILY HISTORY includes your father, alive, with Gout. Your mother, alive, with Kidney stones and Heart disease. No known colon or prostate cancer.    YOUR ROUTINE HEALTH MAINTENANCE includes a Tetanus booster reported within 5 years, a Flu vaccine in 2020, and a completed COVID vaccine series and booster.    PHYSICAL EXAMINATION: You are 5 feet 10 inches tall, weighing 181 pounds with a body mass index of 26. Your blood pressure is 136/83. Your heart rate is 66 for an beats per minute. Your respiratory rate is 16 per minute. All of these are normal values. Your physical examination did not reveal any significant abnormal findings.    YOUR BLOOD WORK AND ADDITIONAL TESTS: Reveal normal white cell counts, red cell counts, and platelet levels. You are not Anemic. Your glucose,  liver, and electrolyte tests are normal.  Your kidney tests are normal. Your total cholesterol is 237. Your triglycerides are  124. Your HDL is 70. Your LDL is 142. Based on these numbers, your 10-year risk of heart attack or stroke is 1%  Your Apolipoprotein A a level is borderline. Your Lipoprotein B level is normal. Your markers of inflammation are normal. Your hemoglobin A1c is 5.1%, which is in a normal range.  You do not have Prediabetes or Diabetes. Your TSH is 0.987, which is in a normal range.You do not have an active Thyroid problem.  Your uric acid is 6.0 and normal. Your vitamin levels are all normal.  our HIV test is negative.    Your CT coronary calcium score is 0. This is consistent with very little coronary heart disease risk. No incidental findings of note were present.    Your Audiology exam revealed normal hearing bilaterally. Wearing hearing protective devices around loud noises is encouraged.    Your exercise stress test showed a hypertensive response to exercise, but otherwise was without abnormality.  This is consistent with a low overall probability for future coronary events.    Your chest x-ray was normal with no acute or chronic cardiopulmonary process identified.     In summary, you are overall in excellent health.  Since your labs are stable, you may safely continue Allopurinol 300 mg daily for gout and use colchicine as needed for any exacerbations.  Based on these results, your overall risk for a cardiovascular event is low. You do not require any additional medication or treatment.    Based on the [place] Preventive Task Force recommendations, you should receive yearly Influenza vaccinations. You should have a Tetanus booster every 10 years. You should start screening for Colon cancer at 45 years old.    It was a pleasure to see you again and perform this [employer] evaluation. If I can be of any further assistance, or if you have any additional questions or concerns, please do not hesitate to let me know.    Sincerely,      Eliud Bay MD, FAAFP

## 2022-06-27 NOTE — PROGRESS NOTES
[facility] Screening    Jonathan Barnes was seen 06/27/2022 for an executive health hearing screen.  Results revealed normal hearing sensitivity 250-8000 Hz, bilaterally.  Otoscopy was within normal limits, bilaterally.    Recommendations:  1. Wear Hearing Protective Devices around loud noises

## 2022-06-27 NOTE — PROGRESS NOTES
Subjective:   Patient ID: Jonathan Barnes is a 41 y.o. male.  Chief Complaint:  Executive [employer]    Patient presents for executive health evaluation  Medical history for Gout and ARS  Surgical history for tonsils, adenoids, and perforated tympanic membrane  No current medications  No known drug allergies  Social history employed by [employer].  .  11-year-old boy.  Quit smoking years ago.  Drinks approximately 2 beers a day.  Denies illicit drug  Family history includes father, alive, gout.  Mother, alive, kidney stones and heart disease.  Colon polyps in a brother, but no colon cancer or prostate cancer.  Health maintenance includes tetanus booster reported within 5 years, flu vaccine 2020, a completed COVID vaccine series and booster.  No complaints or concerns today      Current Outpatient Medications:     colchicine (COLCRYS) 0.6 mg tablet, Take 2 tablets in the morning and 1 tablet in the evening on day 1, then take 1 tablet daily on days 2 through 5., Disp: 6 tablet, Rfl: 3    loratadine (CLARITIN) 10 mg tablet, , Disp: , Rfl:     allopurinoL (ZYLOPRIM) 300 MG tablet, Take 1 tablet (300 mg total) by mouth once daily., Disp: 90 tablet, Rfl: 3    Review of Systems   Constitutional: Negative for chills, fatigue and fever.   HENT: Negative for congestion, dental problem, ear discharge, ear pain, postnasal drip, rhinorrhea, sinus pressure, sinus pain, sore throat and trouble swallowing.    Eyes: Negative for pain, redness and visual disturbance.   Respiratory: Negative for cough, chest tightness, shortness of breath and wheezing.    Cardiovascular: Negative for chest pain, palpitations and leg swelling.   Gastrointestinal: Negative for abdominal pain, constipation, diarrhea, nausea and vomiting.   Endocrine: Negative for polydipsia, polyphagia and polyuria.   Genitourinary: Negative for difficulty urinating, dysuria, flank pain, frequency, hematuria and urgency.   Musculoskeletal: Negative for arthralgias,  "joint swelling and myalgias.   Skin: Negative for rash.   Neurological: Negative for dizziness, weakness, light-headedness and headaches.   Hematological: Negative for adenopathy.   Psychiatric/Behavioral: Negative for sleep disturbance. The patient is not nervous/anxious.      Objective:   /83   Pulse 66   Temp 96.7 °F (35.9 °C) (Tympanic)   Ht 5' 10" (1.778 m)   Wt 82 kg (180 lb 12.4 oz)   BMI 25.94 kg/m²     Physical Exam  Vitals and nursing note reviewed.   Constitutional:       Appearance: Normal appearance. He is well-developed and overweight.   HENT:      Right Ear: Hearing, ear canal and external ear normal.      Left Ear: Hearing, ear canal and external ear normal.      Nose: Nose normal. No mucosal edema, congestion or rhinorrhea.      Right Turbinates: Not enlarged or swollen.      Left Turbinates: Not enlarged or swollen.      Right Sinus: No maxillary sinus tenderness or frontal sinus tenderness.      Left Sinus: No maxillary sinus tenderness or frontal sinus tenderness.      Mouth/Throat:      Mouth: No oral lesions.      Pharynx: Oropharynx is clear. Uvula midline.      Tonsils: No tonsillar exudate.   Eyes:      General: No scleral icterus.        Right eye: No discharge.         Left eye: No discharge.      Conjunctiva/sclera: Conjunctivae normal.      Right eye: Right conjunctiva is not injected.      Left eye: Left conjunctiva is not injected.   Neck:      Thyroid: No thyroid mass, thyromegaly or thyroid tenderness.      Vascular: No JVD.   Cardiovascular:      Rate and Rhythm: Regular rhythm. Bradycardia present.      Pulses:           Radial pulses are 2+ on the right side and 2+ on the left side.      Heart sounds: Normal heart sounds. No murmur heard.    No friction rub. No gallop.   Pulmonary:      Effort: Pulmonary effort is normal. No accessory muscle usage or respiratory distress.      Breath sounds: Normal breath sounds. No wheezing, rhonchi or rales.   Abdominal:      General: " There is no distension.      Palpations: Abdomen is soft.      Tenderness: There is no abdominal tenderness. There is no guarding or rebound.   Musculoskeletal:      Right lower leg: No edema.      Left lower leg: No edema.   Lymphadenopathy:      Cervical: No cervical adenopathy.   Skin:     General: Skin is warm and dry.      Findings: No rash.   Neurological:      Mental Status: He is alert and oriented to person, place, and time.      Coordination: Coordination is intact. Coordination normal.      Gait: Gait is intact. Gait normal.   Psychiatric:         Attention and Perception: Attention normal.         Mood and Affect: Mood normal.         Speech: Speech normal.         Behavior: Behavior normal. Behavior is cooperative.         Thought Content: Thought content normal.         Cognition and Memory: Cognition normal.         Judgment: Judgment normal.     CBC, CMP, A1c, TSH, PSA all normal  Total cholesterol 237. Triglycerides 124. HDL 70. .   CRP negative.  Lipoprotein B and Apolipoprotein A a pending  Hepatitis-C new HIV pending    Urinalysis normal.  Microalbumin/creatinine normal.  Magnesium, B12, ferritin, iron TIBC normal.  Vitamin-D level pending.  Uric acid normal.    CT coronary calcium score 0. No incidental findings in note.    Audiology exam with normal hearing bilaterally    Stress test with hypertensive response to exercise, otherwise normal    Chest x-ray normal    Assessment:       ICD-10-CM ICD-9-CM   1. Routine general medical examination at a health care facility  Z00.00 V70.0   2. Idiopathic chronic gout of foot without tophus, unspecified laterality  M1A.0790 274.02     Plan:   Routine general medical examination at a health care facility  Blood pressure normal.  BMI 26. Overall exam stable.  Send full executive Health letter when all test resulted.  Colon cancer screening at 45 years old  Prostate cancer screening today  Reported tetanus booster up-to-date  Completed primary COVID  vaccine series in booster  Flu vaccine advised next season    Idiopathic chronic gout of foot without tophus, unspecified laterality  -     allopurinoL (ZYLOPRIM) 300 MG tablet; Take 1 tablet (300 mg total) by mouth once daily.  Dispense: 90 tablet; Refill: 3  Stable.  Controlled.  Uric acid normal.  Kidney and liver function test normal.  Continue allopurinol 300 mg daily  Continue Colcrys as needed for breakthrough attacks    Return clinic 1 year for [facility] evaluation or sooner if needed

## 2022-06-28 LAB
ALBUMIN SERPL BCP-MCNC: 3.9 G/DL (ref 3.5–5.2)
HIV 1+2 AB+HIV1 P24 AG SERPL QL IA: NEGATIVE

## 2022-06-28 PROCEDURE — 82040 ASSAY OF SERUM ALBUMIN: CPT | Performed by: FAMILY MEDICINE

## 2022-06-29 LAB — APO B SERPL-MCNC: 107 MG/DL

## 2022-06-30 LAB — LPA SERPL-MCNC: <5 MG/DL (ref 0–30)

## 2022-07-15 DIAGNOSIS — M1A.0790 IDIOPATHIC CHRONIC GOUT OF FOOT WITHOUT TOPHUS, UNSPECIFIED LATERALITY: ICD-10-CM

## 2022-07-15 RX ORDER — ALLOPURINOL 300 MG/1
TABLET ORAL
Refills: 0 | OUTPATIENT
Start: 2022-07-15

## 2022-07-15 NOTE — TELEPHONE ENCOUNTER
No new care gaps identified.  [facility] Embedded Care Gaps. Reference number: [id number]. 7/15/2022   4:35:26 AM CDT

## 2022-07-15 NOTE — TELEPHONE ENCOUNTER
[facility] Note  Quick DC. Inappropriate Request   Refill request requires further review by MD: NO   Medication Therapy Plan: Pharmacy is requesting new script(s) for the following medications without required information, (sig/ frequency/qty/etc)     [facility] action(s):  Quick Discontinue      Duplicate Pended Encounter(s)/ Last Prescribed Details:    Pharmacies have been requesting medications for patients without required information, (sig, frequency, qty, etc.). In addition, requests are sent for medication(s) pt. are currently not taking, and medications patients have never taken.    We have spoken to the pharmacies about these request types and advised their teams previously that we are unable to assess these New Script requests and require all details for these requests. This is a known issue and has been reported.        Medication related problems are not assessed for QDC.   Medication Reconciliation Completed? NO Were there pending details that required adjustment? NO     Automatic Epic Generated Protocol Data Below:   Requested Prescriptions   Pending Prescriptions Disp Refills    allopurinoL (ZYLOPRIM) 300 MG tablet [Pharmacy Med Name: ALLOPURINOL TABS 300MG]  0              Appointments      Date Provider   Last Visit   6/27/2022 Eliud Bay MD   Next Visit   Visit date not found Eliud Bay MD        Note composed:7:06 AM 07/15/2022

## 2022-09-09 DIAGNOSIS — M1A.0790 IDIOPATHIC CHRONIC GOUT OF FOOT WITHOUT TOPHUS, UNSPECIFIED LATERALITY: ICD-10-CM

## 2022-09-09 RX ORDER — ALLOPURINOL 300 MG/1
TABLET ORAL
Refills: 0 | OUTPATIENT
Start: 2022-09-09

## 2022-09-09 NOTE — TELEPHONE ENCOUNTER
No new care gaps identified.  [facility] Embedded Care Gaps. Reference number: [id number]. 9/09/2022   6:03:52 AM JUANAT

## 2022-09-09 NOTE — TELEPHONE ENCOUNTER
Refill Decision Note   Jonathan Barnes  is requesting a refill authorization.  Brief Assessment and Rationale for Refill:  Quick Discontinue     Medication Therapy Plan: Pharmacy is requesting new scripts for the following medications without required information, (sig/ frequency/qty/etc)     Medication Reconciliation Completed: No   Comments:     No Care Gaps recommended.     Note composed:11:01 AM 09/09/2022

## 2023-01-04 ENCOUNTER — TELEPHONE (OUTPATIENT)
Dept: ORTHOPEDICS | Facility: CLINIC | Age: 43
End: 2023-01-04
Payer: COMMERCIAL

## 2023-01-04 NOTE — TELEPHONE ENCOUNTER
Spoke with pt. Confirmed he will be bringing disc with images and printed report to appt on 1/6/23.    ----- Message from Rajwinder Perdomo sent at 1/4/2023 12:52 PM CST -----  Contact: [phone]  Type:  Patient Returning Call    Who Called:Jonathan   Who Left Message for Patient:nurse  Does the patient know what this is regarding?:Xrays   Would the patient rather a call back or a response via [facility]? Call back  Best Call Back Number:[phone]    Additional Information: pt states he will bring xrays with him to appt

## 2023-01-06 ENCOUNTER — OFFICE VISIT (OUTPATIENT)
Dept: ORTHOPEDICS | Facility: CLINIC | Age: 43
End: 2023-01-06
Payer: COMMERCIAL

## 2023-01-06 VITALS — BODY MASS INDEX: 27.27 KG/M2 | WEIGHT: 190.06 LBS

## 2023-01-06 DIAGNOSIS — M76.899 QUADRICEPS TENDINITIS: Primary | ICD-10-CM

## 2023-01-06 DIAGNOSIS — M76.892 ENTHESOPATHY OF LEFT KNEE REGION: ICD-10-CM

## 2023-01-06 DIAGNOSIS — M25.562 ACUTE PAIN OF LEFT KNEE: ICD-10-CM

## 2023-01-06 DIAGNOSIS — M76.899 TENDINOSIS OF QUADRICEPS TENDON: ICD-10-CM

## 2023-01-06 PROCEDURE — 99203 PR OFFICE/OUTPT VISIT, NEW, LEVL III, 30-44 MIN: ICD-10-PCS | Mod: 25,S$GLB,, | Performed by: STUDENT IN AN ORGANIZED HEALTH CARE EDUCATION/TRAINING PROGRAM

## 2023-01-06 PROCEDURE — 3008F PR BODY MASS INDEX (BMI) DOCUMENTED: ICD-10-PCS | Mod: CPTII,S$GLB,, | Performed by: STUDENT IN AN ORGANIZED HEALTH CARE EDUCATION/TRAINING PROGRAM

## 2023-01-06 PROCEDURE — 3008F BODY MASS INDEX DOCD: CPT | Mod: CPTII,S$GLB,, | Performed by: STUDENT IN AN ORGANIZED HEALTH CARE EDUCATION/TRAINING PROGRAM

## 2023-01-06 PROCEDURE — 1159F MED LIST DOCD IN RCRD: CPT | Mod: CPTII,S$GLB,, | Performed by: STUDENT IN AN ORGANIZED HEALTH CARE EDUCATION/TRAINING PROGRAM

## 2023-01-06 PROCEDURE — 1160F PR REVIEW ALL MEDS BY PRESCRIBER/CLIN PHARMACIST DOCUMENTED: ICD-10-PCS | Mod: CPTII,S$GLB,, | Performed by: STUDENT IN AN ORGANIZED HEALTH CARE EDUCATION/TRAINING PROGRAM

## 2023-01-06 PROCEDURE — 99999 PR PBB SHADOW E&M-EST. PATIENT-LVL III: CPT | Mod: PBBFAC,,, | Performed by: STUDENT IN AN ORGANIZED HEALTH CARE EDUCATION/TRAINING PROGRAM

## 2023-01-06 PROCEDURE — 1160F RVW MEDS BY RX/DR IN RCRD: CPT | Mod: CPTII,S$GLB,, | Performed by: STUDENT IN AN ORGANIZED HEALTH CARE EDUCATION/TRAINING PROGRAM

## 2023-01-06 PROCEDURE — 99203 OFFICE O/P NEW LOW 30 MIN: CPT | Mod: 25,S$GLB,, | Performed by: STUDENT IN AN ORGANIZED HEALTH CARE EDUCATION/TRAINING PROGRAM

## 2023-01-06 PROCEDURE — 1159F PR MEDICATION LIST DOCUMENTED IN MEDICAL RECORD: ICD-10-PCS | Mod: CPTII,S$GLB,, | Performed by: STUDENT IN AN ORGANIZED HEALTH CARE EDUCATION/TRAINING PROGRAM

## 2023-01-06 PROCEDURE — 99999 PR PBB SHADOW E&M-EST. PATIENT-LVL III: ICD-10-PCS | Mod: PBBFAC,,, | Performed by: STUDENT IN AN ORGANIZED HEALTH CARE EDUCATION/TRAINING PROGRAM

## 2023-01-06 RX ORDER — MELOXICAM 15 MG/1
15 TABLET ORAL DAILY
COMMUNITY
End: 2023-07-28

## 2023-01-06 NOTE — PATIENT INSTRUCTIONS
Assessment:  Jonathan Barnes is a 42 y.o. male with a chief complaint of Pain of the Left Knee    Encounter Diagnoses   Name Primary?    Acute quadriceps tendinitis Yes    Chronic tendinosis of quadriceps tendon     Enthesopathy of left knee region     Acute pain of left knee       Plan:  Outside XR reviewed today demonstrating a small suprapatellar enthesophyte at the distal quadriceps insertion.  Diagnostic ultrasound performed today of the distal quadriceps demonstrating insertional tendinosis and suprapatellar enthesopathy(bone spur)  Recommend continued conservative management, and I anticipate a full recovery  Continue Mobic 15mg as prescribed by PCP  He will go PT with his wife for quad tendinosis/tendinitis  We discussed further diagnostic and treatment options including MRI, tenotomy or PRP, should he not adequate improve with the above measures    Follow-up: PRN or sooner if there are any problems between now and then.    Thank you for choosing [facility] and Dr. Brian Henderson for your orthopedic & sports medicine care. It is our goal to provide you with exceptional care that will help keep you healthy, active, and get you back in the game.    Please do not hesitate to reach out to us via email, phone, or MyChart with any questions, concerns, or feedback.    If you are experiencing pain/discomfort ,or have questions after 5pm and would like to be connected to the [facility] on-call team, please call this number and specify which Sports Medicine provider is treating you: [phone]

## 2023-01-06 NOTE — PROGRESS NOTES
Patient ID: Jonathan Barnes  YOB: 1980  MRN: [id number]    Chief Complaint: Pain of the Left Knee    Referred By: Self    Occupation:     History of Present Illness: Jonathan Barnes is a 42 y.o. male who presents today with left knee pain.     He complains of chronic knee pain over the past few months that worsened while running in early 2022.  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  He sought care with his PCP at [facility] where a 2V XR was performed and negative for acute pathology.  His pain is located in the anterior knee.  He has treated with Mobic which has helped significantly.  His pain is much improved - 95%.  He also reports that prior to this incident he has had frequent painless popping his anterior knee.  In the past he has also had IT band issues but not recently.    He has history of gout in his foot.    Past Medical History:   Past Medical History:   Diagnosis Date    Gout      Past Surgical History:   Procedure Laterality Date    ADENOIDECTOMY      REPAIR OF TYMPANIC MEMBRANE USING PATCH      TONSILLECTOMY       Family History   Problem Relation Age of Onset    Nephrolithiasis Mother     Heart disease Mother     Gout Father      Social History     Socioeconomic History    Marital status:    Tobacco Use    Smoking status: Former     Types: Cigarettes     Quit date: 2000     Years since quittin.6    Smokeless tobacco: Never   Substance and Sexual Activity    Alcohol use: Yes     Alcohol/week: 14.0 standard drinks     Types: 14 Cans of beer per week    Drug use: Never    Sexual activity: Yes     Medication List with Changes/Refills   Current Medications    ALLOPURINOL (ZYLOPRIM) 300 MG TABLET    Take 1 tablet (300 mg total) by mouth once daily.    COLCHICINE (COLCRYS) 0.6 MG TABLET    Take 2 tablets in the morning and 1 tablet in the evening on day  1, then take 1 tablet daily on days 2 through 5.    LORATADINE (CLARITIN) 10 MG TABLET        MELOXICAM (MOBIC) 15 MG TABLET    Take 15 mg by mouth once daily.     Review of patient's allergies indicates:   Allergen Reactions    Penicillins        Physical Exam:   Body mass index is 27.27 kg/m².    Physical Exam  Vitals reviewed.   Constitutional:       Appearance: Normal appearance.   HENT:      Head: Normocephalic and atraumatic.      Nose: Nose normal.   Eyes:      Extraocular Movements: Extraocular movements intact.      Conjunctiva/sclera: Conjunctivae normal.   Pulmonary:      Effort: Pulmonary effort is normal.   Skin:     General: Skin is warm and dry.   Neurological:      General: No focal deficit present.      Mental Status: He is alert and oriented to person, place, and time.   Psychiatric:         Mood and Affect: Mood normal.     Detailed MSK exam:      Left Knee:  Inspection: No effusion, erythema, or ecchymosis   Palpation tenderness: Superior patella, distal quadriceps insertion  Range of motion: 0 deg extension - 130 deg flexion with mild pain at end range flexion  Strength:  5/5 Extension with pain    5/5 Flexion    5/5 Hip Abduction  Stability: Negative ACL/Lachman      Negative Posterior Drawer      Negative MCL/Valgus Stress      Negative LCL/Varus Stress   Patella Exam:  Lateral patellar tracking   Negative Patellar apprehension   Negative Patellar grind   Meniscus Exam: Negative Jessica's  N/V Exam:  Tibial:    Normal sensory (plantar foot)  Normal motor (FHL)    Sup Peroneal:   Normal sensory (dorsal foot)  Normal motor (Peroneals)            Deep Peroneal:   Normal sensory (1st web space)  Normal motor (EHL)    Sural:   Normal sensory (lateral foot)   Saphenous:   Normal sensory (medial lower leg)   Normal pedal pulses, warm and well perfused with capillary refill < 2 sec     Imaging:  XR  L knee 12/29 reviewed today  Impression:  Medial and lateral compartments appear normal.  There is  suprapatellar calcification or spurring which could represent evidence of distal quadriceps tendinosis.    Hector Kruse MD    Relevant imaging results were reviewed and interpreted by me and per my read:  No significant degenerative changes.  No effusion.  No acute abnormalities.  There is a small enthesophyte at the superior aspect of the patella at the insertion of the quadriceps tendon.      FOCUSED:  1. Diagnostic Extremity - MSK-Sports Ultrasound was recommended due to acute left knee pain.  2. Diagnostic Extremity - MSK-Sports Ultrasound Performed: Brian Henderson Extremity Study: left quadriceps tendon.    TECHNIQUE: Real time ultrasound examination of the left quadriceps tendon was performed with Son24x7 Learningte Edge 2, 9-L MHz linear probe(s).     FINDINGS: The images are of adequate diagnostic quality with identification of all echogenic structures made except for the vascular structures unless otherwise noted.     The quadriceps tendon is intact.  However, there is thickening and heterogeneity within the distal tendon near its insertion on the patella, as well as cortical irregularity and osteophytic spurring of the superior patella, which corresponds with site of maximal tenderness with sonopalpation.  The proximal quadriceps tendon appears normal.    The rest of the sonographic examination was unremarkable.  Ultrasound images were directly reviewed with the patient and then a treatment plan was discussed.  The images were saved and stored for documentation in the EMR.     IMPRESSION:  Tendinosis of the quadriceps tendon with enthesopathy of the superior pole of the patella.      This was discussed with the patient and / or family today.       Patient Instructions   Assessment:  Jonathan Barnes is a 42 y.o. male with a chief complaint of Pain of the Left Knee    Encounter Diagnoses   Name Primary?    Acute quadriceps tendinitis Yes    Chronic tendinosis of quadriceps tendon     Enthesopathy of left knee  region     Acute pain of left knee       Plan:  Outside XR reviewed today demonstrating a small suprapatellar enthesophyte at the distal quadriceps insertion.  Diagnostic ultrasound performed today of the distal quadriceps demonstrating insertional tendinosis and suprapatellar enthesopathy(bone spur)  Recommend continued conservative management, and I anticipate a full recovery  Continue Mobic 15mg as prescribed by PCP  He will go PT with his wife for quad tendinosis/tendinitis  We discussed further diagnostic and treatment options including MRI, tenotomy or PRP, should he not adequate improve with the above measures    Follow-up: PRN or sooner if there are any problems between now and then.    Thank you for choosing [facility] and Dr. Brian Henderson for your orthopedic & sports medicine care. It is our goal to provide you with exceptional care that will help keep you healthy, active, and get you back in the game.    Please do not hesitate to reach out to us via email, phone, or MyChart with any questions, concerns, or feedback.    If you are experiencing pain/discomfort ,or have questions after 5pm and would like to be connected to the [facility] on-call team, please call this number and specify which Sports Medicine provider is treating you: [phone]          A copy of today's visit note has been sent to the referring provider.       Brian Henderson MD  Primary Care Sports Medicine        Disclaimer: This note was prepared using a voice recognition system and is likely to have sound alike errors within the text.

## 2023-03-24 DIAGNOSIS — M1A.0790 IDIOPATHIC CHRONIC GOUT OF FOOT WITHOUT TOPHUS, UNSPECIFIED LATERALITY: ICD-10-CM

## 2023-03-24 RX ORDER — ALLOPURINOL 300 MG/1
TABLET ORAL
Refills: 0 | OUTPATIENT
Start: 2023-03-24

## 2023-03-24 NOTE — TELEPHONE ENCOUNTER
Care Due:                  Date            Visit Type   Department     Provider  --------------------------------------------------------------------------------                                ADMIT                               REVIEW EXEC   [facility] INTERNAL  Last Visit: 06-      CHECK        MEDICINE       Eliud Bay  Next Visit: None Scheduled  None         None Found                                                            Last  Test          Frequency    Reason                     Performed    Due Date  --------------------------------------------------------------------------------    Office Visit  12 months..  allopurinoL, colchicine..  06- 06-    CBC.........  12 months..  allopurinoL..............  06- 06-    CMP.........  12 months..  allopurinoL, colchicine..  06- 06-    Uric Acid...  12 months..  allopurinoL, colchicine..  06- 06-    Health [facility] Embedded Care Gaps. Reference number: [id number]. 3/24/2023   5:15:56 PM CDT

## 2023-03-25 NOTE — TELEPHONE ENCOUNTER
Refill Decision Note   Jonathan Barnes  is requesting a refill authorization.  Brief Assessment and Rationale for Refill:  Quick Discontinue     Medication Therapy Plan: Pharmacy is requesting new scripts for the following medications without required information, (sig/ frequency/qty/etc)     Medication Reconciliation Completed: No   Comments:     No Care Gaps recommended.     Note composed:7:30 PM 03/24/2023

## 2023-04-29 DIAGNOSIS — M1A.0790 IDIOPATHIC CHRONIC GOUT OF FOOT WITHOUT TOPHUS, UNSPECIFIED LATERALITY: ICD-10-CM

## 2023-04-29 NOTE — TELEPHONE ENCOUNTER
No care due was identified.  [facility] Embedded Care Due Messages. Reference number: [id number].   4/29/2023 10:30:30 AM CDT

## 2023-04-30 RX ORDER — ALLOPURINOL 300 MG/1
TABLET ORAL
Refills: 0 | OUTPATIENT
Start: 2023-04-30

## 2023-04-30 NOTE — TELEPHONE ENCOUNTER
Refill Decision Note   Jonathan Barnes  is requesting a refill authorization.  Brief Assessment and Rationale for Refill:  Quick Discontinue     Medication Therapy Plan:    Pharmacy is requesting new scripts for the following medications without required information, (sig/ frequency/qty/etc)      Medication Reconciliation Completed: No     Comments: Pharmacies have been requesting medications for patients without required information, (sig, frequency, qty, etc.). In addition, requests are sent for medication(s) pt. are currently not taking, and medications patients have never taken.    We have spoken to the pharmacies about these request types and advised their teams previously that we are unable to assess these New Script requests and require all details for these requests. This is a known issue and has been reported.     Note composed:11:51 AM 04/30/2023

## 2023-05-11 DIAGNOSIS — M1A.0790 IDIOPATHIC CHRONIC GOUT OF FOOT WITHOUT TOPHUS, UNSPECIFIED LATERALITY: ICD-10-CM

## 2023-05-11 RX ORDER — COLCHICINE 0.6 MG/1
TABLET ORAL
Qty: 7 TABLET | Refills: 0 | Status: SHIPPED | OUTPATIENT
Start: 2023-05-11 | End: 2023-06-20

## 2023-05-11 NOTE — TELEPHONE ENCOUNTER
No care due was identified.  [facility] Embedded Care Due Messages. Reference number: [id number].   5/11/2023 2:55:53 PM CDT

## 2023-05-11 NOTE — TELEPHONE ENCOUNTER
Refill Decision Note   Jonathan Barnes  is requesting a refill authorization.  Brief Assessment and Rationale for Refill:  Approve     Medication Therapy Plan:         Comments:     Note composed:3:02 PM 05/11/2023             Appointments     Last Visit   6/27/2022 Eliud Bay MD   Next Visit   Visit date not found Eliud Bay MD

## 2023-06-07 DIAGNOSIS — Z00.00 ROUTINE GENERAL MEDICAL EXAMINATION AT A HEALTH CARE FACILITY: Primary | ICD-10-CM

## 2023-06-18 DIAGNOSIS — M1A.0790 IDIOPATHIC CHRONIC GOUT OF FOOT WITHOUT TOPHUS, UNSPECIFIED LATERALITY: ICD-10-CM

## 2023-06-18 NOTE — TELEPHONE ENCOUNTER
Care Due:                  Date            Visit Type   Department     Provider  --------------------------------------------------------------------------------                                ADMIT                               REVIEW EXEC   [facility] INTERNAL  Last Visit: 06-      CHECK        MEDICINE       Eliud Bay                              ADMIT                               REVIEW EXEC   [facility] INTERNAL  Next Visit: 07-      CHECK        MEDICINE       Nitish Schaefer                                                            Last  Test          Frequency    Reason                     Performed    Due Date  --------------------------------------------------------------------------------    CBC.........  12 months..  allopurinoL..............  06- 06-    CMP.........  12 months..  allopurinoL, colchicine..  06- 06-    Uric Acid...  12 months..  allopurinoL, colchicine..  06- 06-    Health [facility] Embedded Care Due Messages. Reference number: [id number].   6/18/2023 10:24:58 AM CDT

## 2023-06-20 ENCOUNTER — PATIENT OUTREACH (OUTPATIENT)
Dept: ADMINISTRATIVE | Facility: HOSPITAL | Age: 43
End: 2023-06-20
Payer: COMMERCIAL

## 2023-06-20 ENCOUNTER — PATIENT MESSAGE (OUTPATIENT)
Dept: ADMINISTRATIVE | Facility: HOSPITAL | Age: 43
End: 2023-06-20
Payer: COMMERCIAL

## 2023-06-20 RX ORDER — COLCHICINE 0.6 MG/1
TABLET ORAL
Qty: 7 TABLET | Refills: 0 | Status: SHIPPED | OUTPATIENT
Start: 2023-06-20 | End: 2023-07-28 | Stop reason: SDUPTHER

## 2023-06-20 NOTE — PROGRESS NOTES
HTN REPORT: per chart review pt is overdue for remote bp, spoke to pt he will take and send thru portal, pt already scheduled for follow up appt 07/28/23.  Sent portal message as a reminder.

## 2023-06-20 NOTE — TELEPHONE ENCOUNTER
Provider Staff:  Action required for this patient     Please see care gap opportunities below in Care Due Message.    Thanks!  [facility]     Appointments      Date Provider   Last Visit   6/27/2022 Eliud Bay MD   Next Visit   Visit date not found Eliud Bay MD     Refill Decision Note   Jonathan Barnes  is requesting a refill authorization.  Brief Assessment and Rationale for Refill:  Approve     Medication Therapy Plan:         Comments:     Note composed:9:55 AM 06/20/2023

## 2023-07-05 DIAGNOSIS — I10 HYPERTENSION: ICD-10-CM

## 2023-07-28 ENCOUNTER — CLINICAL SUPPORT (OUTPATIENT)
Dept: INTERNAL MEDICINE | Facility: CLINIC | Age: 43
End: 2023-07-28
Payer: COMMERCIAL

## 2023-07-28 ENCOUNTER — OFFICE VISIT (OUTPATIENT)
Dept: INTERNAL MEDICINE | Facility: CLINIC | Age: 43
End: 2023-07-28

## 2023-07-28 ENCOUNTER — HOSPITAL ENCOUNTER (OUTPATIENT)
Dept: CARDIOLOGY | Facility: HOSPITAL | Age: 43
Discharge: HOME | End: 2023-07-28
Attending: INTERNAL MEDICINE

## 2023-07-28 ENCOUNTER — CLINICAL SUPPORT (OUTPATIENT)
Dept: INTERNAL MEDICINE | Facility: CLINIC | Age: 43
End: 2023-07-28

## 2023-07-28 VITALS
BODY MASS INDEX: 26.34 KG/M2 | DIASTOLIC BLOOD PRESSURE: 76 MMHG | HEIGHT: 70 IN | TEMPERATURE: 98 F | WEIGHT: 184 LBS | SYSTOLIC BLOOD PRESSURE: 116 MMHG | HEART RATE: 71 BPM

## 2023-07-28 DIAGNOSIS — M1A.0790 IDIOPATHIC CHRONIC GOUT OF FOOT WITHOUT TOPHUS, UNSPECIFIED LATERALITY: ICD-10-CM

## 2023-07-28 DIAGNOSIS — Z00.00 ROUTINE GENERAL MEDICAL EXAMINATION AT A HEALTH CARE FACILITY: Primary | ICD-10-CM

## 2023-07-28 DIAGNOSIS — Z00.00 ROUTINE GENERAL MEDICAL EXAMINATION AT A HEALTH CARE FACILITY: ICD-10-CM

## 2023-07-28 LAB
ALBUMIN SERPL BCP-MCNC: 3.9 G/DL (ref 3.5–5.2)
ALP SERPL-CCNC: 81 U/L (ref 55–135)
ALT SERPL W/O P-5'-P-CCNC: 47 U/L (ref 10–44)
ANION GAP SERPL CALC-SCNC: 9 MMOL/L (ref 8–16)
AST SERPL-CCNC: 37 U/L (ref 10–40)
BILIRUB SERPL-MCNC: 1.2 MG/DL (ref 0.1–1)
BILIRUB UR QL STRIP: NEGATIVE
BUN SERPL-MCNC: 13 MG/DL (ref 6–20)
CALCIUM SERPL-MCNC: 9.3 MG/DL (ref 8.7–10.5)
CHLORIDE SERPL-SCNC: 103 MMOL/L (ref 95–110)
CHOLEST SERPL-MCNC: 182 MG/DL (ref 120–199)
CHOLEST/HDLC SERPL: 3.7 {RATIO} (ref 2–5)
CLARITY UR: CLEAR
CO2 SERPL-SCNC: 27 MMOL/L (ref 23–29)
COLOR UR: YELLOW
COMPLEXED PSA SERPL-MCNC: 0.56 NG/ML (ref 0–4)
CREAT SERPL-MCNC: 1.2 MG/DL (ref 0.5–1.4)
ERYTHROCYTE [DISTWIDTH] IN BLOOD BY AUTOMATED COUNT: 14.6 % (ref 11.5–14.5)
EST. GFR  (NO RACE VARIABLE): >60 ML/MIN/1.73 M^2
ESTIMATED AVG GLUCOSE: 94 MG/DL (ref 68–131)
GLUCOSE SERPL-MCNC: 88 MG/DL (ref 70–110)
GLUCOSE UR QL STRIP: NEGATIVE
HBA1C MFR BLD: 4.9 % (ref 4–5.6)
HCT VFR BLD AUTO: 47.9 % (ref 40–54)
HCV AB SERPL QL IA: NORMAL
HDLC SERPL-MCNC: 49 MG/DL (ref 40–75)
HDLC SERPL: 26.9 % (ref 20–50)
HGB BLD-MCNC: 17 G/DL (ref 14–18)
HGB UR QL STRIP: NEGATIVE
KETONES UR QL STRIP: NEGATIVE
LDLC SERPL CALC-MCNC: 115.4 MG/DL (ref 63–159)
LEUKOCYTE ESTERASE UR QL STRIP: NEGATIVE
MCH RBC QN AUTO: 31.5 PG (ref 27–31)
MCHC RBC AUTO-ENTMCNC: 35.5 G/DL (ref 32–36)
MCV RBC AUTO: 89 FL (ref 82–98)
NITRITE UR QL STRIP: NEGATIVE
NONHDLC SERPL-MCNC: 133 MG/DL
PH UR STRIP: 6 [PH] (ref 5–8)
PLATELET # BLD AUTO: 191 K/UL (ref 150–450)
PMV BLD AUTO: 9.8 FL (ref 9.2–12.9)
POTASSIUM SERPL-SCNC: 4.2 MMOL/L (ref 3.5–5.1)
PROT SERPL-MCNC: 6.9 G/DL (ref 6–8.4)
PROT UR QL STRIP: NEGATIVE
RBC # BLD AUTO: 5.39 M/UL (ref 4.6–6.2)
SODIUM SERPL-SCNC: 139 MMOL/L (ref 136–145)
SP GR UR STRIP: 1.01 (ref 1–1.03)
TRIGL SERPL-MCNC: 88 MG/DL (ref 30–150)
TSH SERPL DL<=0.005 MIU/L-ACNC: 1.42 UIU/ML (ref 0.4–4)
URATE SERPL-MCNC: 3.9 MG/DL (ref 3.4–7)
URN SPEC COLLECT METH UR: NORMAL
WBC # BLD AUTO: 7.35 K/UL (ref 3.9–12.7)

## 2023-07-28 PROCEDURE — 99999 PR PBB SHADOW E&M-EST. PATIENT-LVL III: CPT | Mod: PBBFAC,,, | Performed by: INTERNAL MEDICINE

## 2023-07-28 PROCEDURE — 84443 ASSAY THYROID STIM HORMONE: CPT | Performed by: INTERNAL MEDICINE

## 2023-07-28 PROCEDURE — 99199 PR SPECIAL SERVICE/PROC/REPORT: ICD-10-PCS | Mod: S$GLB,,, | Performed by: INTERNAL MEDICINE

## 2023-07-28 PROCEDURE — 99199 UNLISTED SPECIAL SVC PX/RPRT: CPT | Mod: S$GLB,,, | Performed by: INTERNAL MEDICINE

## 2023-07-28 PROCEDURE — 93010 EKG 12-LEAD: ICD-10-PCS | Mod: ,,, | Performed by: INTERNAL MEDICINE

## 2023-07-28 PROCEDURE — 84153 ASSAY OF PSA TOTAL: CPT | Performed by: INTERNAL MEDICINE

## 2023-07-28 PROCEDURE — 84550 ASSAY OF BLOOD/URIC ACID: CPT | Performed by: INTERNAL MEDICINE

## 2023-07-28 PROCEDURE — 86803 HEPATITIS C AB TEST: CPT | Performed by: INTERNAL MEDICINE

## 2023-07-28 PROCEDURE — 93005 ELECTROCARDIOGRAM TRACING: CPT

## 2023-07-28 PROCEDURE — 93010 ELECTROCARDIOGRAM REPORT: CPT | Mod: ,,, | Performed by: INTERNAL MEDICINE

## 2023-07-28 PROCEDURE — 99396 PR PREVENTIVE VISIT,EST,40-64: ICD-10-PCS | Mod: S$GLB,,, | Performed by: INTERNAL MEDICINE

## 2023-07-28 PROCEDURE — 81003 URINALYSIS AUTO W/O SCOPE: CPT | Performed by: INTERNAL MEDICINE

## 2023-07-28 PROCEDURE — 99999 PR PBB SHADOW E&M-EST. PATIENT-LVL III: ICD-10-PCS | Mod: PBBFAC,,, | Performed by: INTERNAL MEDICINE

## 2023-07-28 PROCEDURE — 99211 OFF/OP EST MAY X REQ PHY/QHP: CPT | Mod: S$GLB,,, | Performed by: INTERNAL MEDICINE

## 2023-07-28 PROCEDURE — 83036 HEMOGLOBIN GLYCOSYLATED A1C: CPT | Performed by: INTERNAL MEDICINE

## 2023-07-28 PROCEDURE — 99211 PR NURSE VISIT, 15 MINS, EXEC HLTH ONLY: ICD-10-PCS | Mod: S$GLB,,, | Performed by: INTERNAL MEDICINE

## 2023-07-28 PROCEDURE — 80061 LIPID PANEL: CPT | Performed by: INTERNAL MEDICINE

## 2023-07-28 PROCEDURE — 99396 PREV VISIT EST AGE 40-64: CPT | Mod: S$GLB,,, | Performed by: INTERNAL MEDICINE

## 2023-07-28 PROCEDURE — 85027 COMPLETE CBC AUTOMATED: CPT | Performed by: INTERNAL MEDICINE

## 2023-07-28 PROCEDURE — 80053 COMPREHEN METABOLIC PANEL: CPT | Performed by: INTERNAL MEDICINE

## 2023-07-28 RX ORDER — ALLOPURINOL 300 MG/1
300 TABLET ORAL DAILY
Qty: 90 TABLET | Refills: 3 | Status: SHIPPED | OUTPATIENT
Start: 2023-07-28 | End: 2023-08-27

## 2023-07-28 RX ORDER — COLCHICINE 0.6 MG/1
TABLET ORAL
Qty: 7 TABLET | Refills: 0 | Status: SHIPPED | OUTPATIENT
Start: 2023-07-28 | End: 2023-09-10

## 2023-07-28 RX ORDER — FLUOXETINE HYDROCHLORIDE 20 MG/1
20 CAPSULE ORAL DAILY
COMMUNITY
Start: 2023-06-08 | End: 2023-09-26

## 2023-07-28 NOTE — PROGRESS NOTES
"Subjective:      Patient ID: Jonathan Barnes is a 42 y.o. male.    Chief Complaint: Executive Health    HPI    On 7/28/23 it was a pleasure to see you again and perform your Executive Health evaluation. At the time of this visit, you are 42 years old.       YOUR PAST MEDICAL HISTORY includes Gout and Allergic rhinosinusitis.     YOUR PAST SURGICAL HISTORY includes a Tympanoplasty, a Tonsillectomy, and an Adenoidectomy.     YOUR CURRENT MEDICATIONS include allopurinol 300 mg daily, fluoxetine, testosterone injection, Claritin 10 mg daily as needed, and Colcrys 0.6 mg tablets as needed for gout attacks     YOUR KNOWN DRUG ALLERGIES include Penicillin.     YOUR SOCIAL HISTORY includes employment by [employer]. . You quit smoking in 2008. . You drink approximately 2 beers a day. You denied any illicit drug use.     YOUR FAMILY HISTORY includes your father with Gout. Your mother with Kidney stones and Heart disease.  Brother with high blood pressure, sister with ovarian cysts     YOUR ROUTINE HEALTH MAINTENANCE includes a Tetanus booster reported up to date. Remember flu vaccine should be available in September or October.        Review of Systems   Constitutional:  Negative for chills and fever.   HENT:  Negative for ear pain and sore throat.    Respiratory:  Negative for cough.    Cardiovascular:  Negative for chest pain.   Gastrointestinal:  Negative for abdominal pain and blood in stool.   Genitourinary:  Negative for dysuria and hematuria.   Neurological:  Negative for seizures and syncope.     Objective:   /76   Pulse 71   Temp 97.7 °F (36.5 °C)   Ht 5' 10" (1.778 m)   Wt 83.5 kg (184 lb)   BMI 26.40 kg/m²     Physical Exam  Constitutional:       General: He is not in acute distress.     Appearance: He is well-developed.   HENT:      Head: Normocephalic and atraumatic.   Eyes:      Extraocular Movements: Extraocular movements intact.   Neck:      Thyroid: No thyromegaly.   Cardiovascular:      Rate and " Rhythm: Normal rate and regular rhythm.   Pulmonary:      Breath sounds: Normal breath sounds. No wheezing or rales.   Abdominal:      General: Bowel sounds are normal.      Palpations: Abdomen is soft.      Tenderness: There is no abdominal tenderness.   Musculoskeletal:         General: No swelling.      Cervical back: Neck supple. No rigidity.   Lymphadenopathy:      Cervical: No cervical adenopathy.   Skin:     General: Skin is warm and dry.   Neurological:      Mental Status: He is alert and oriented to person, place, and time.   Psychiatric:         Behavior: Behavior normal.         Lab Results   Component Value Date    WBC 7.35 07/28/2023    HGB 17.0 07/28/2023    HGB 16.7 06/27/2022    HGB 16.4 05/12/2021    HCT 47.9 07/28/2023    MCV 89 07/28/2023    MCV 95 06/27/2022    MCV 92 05/12/2021     07/28/2023    CHOL 182 07/28/2023    TRIG 88 07/28/2023    HDL 49 07/28/2023    LDLCALC 115.4 07/28/2023    LDLCALC 142.2 06/27/2022    LDLCALC 134.6 05/12/2021    ALT 47 (H) 07/28/2023    AST 37 07/28/2023     07/28/2023    K 4.2 07/28/2023    CALCIUM 9.3 07/28/2023     07/28/2023    CO2 27 07/28/2023    BUN 13 07/28/2023    CREATININE 1.2 07/28/2023    CREATININE 1.3 06/27/2022    CREATININE 1.5 (H) 05/12/2021    EGFRNORACEVR >60 07/28/2023    TSH 1.422 07/28/2023    TSH 0.987 06/27/2022    TSH 1.382 05/12/2021    PSA 0.56 07/28/2023    PSA 0.45 06/27/2022    GLU 88 07/28/2023    HGBA1C 4.9 07/28/2023    HGBA1C 5.1 06/27/2022    HGBA1C 5.0 05/12/2021    PQNKNOER89LS 19 (L) 05/12/2021          The 10-year ASCVD risk score (Kim MORGAN, et al., 2019) is: 1%    Values used to calculate the score:      Age: 42 years      Sex: Male      Is Non- : No      Diabetic: No      Tobacco smoker: No      Systolic Blood Pressure: 116 mmHg      Is BP treated: No      HDL Cholesterol: 49 mg/dL      Total Cholesterol: 182 mg/dL     Assessment:     1. Routine general medical examination at a  health care facility    2. Idiopathic chronic gout of foot without tophus, unspecified laterality      Plan:   1. Routine general medical examination at a health care facility  -     Hepatitis C Antibody; Future; Expected date: 07/28/2023  -     Uric Acid; Future; Expected date: 07/28/2023      2. Idiopathic chronic gout of foot without tophus, unspecified laterality  -     allopurinoL (ZYLOPRIM) 300 MG tablet; Take 1 tablet (300 mg total) by mouth once daily.  Dispense: 90 tablet; Refill: 3  -     colchicine (COLCRYS) 0.6 mg tablet; TAKE 2 TABLETS BY MOUTH IN THE MORNING AND 1 TABLET IN THE EVENING ON DAY 1 THEN 1 TABLET DAILY ON DAYS 2 THROUGH 5  Dispense: 7 tablet; Refill: 0      Heart healthy diet, regular exercise, and regular use of sunscreen.   Health maintenance reviewed patient.  See executive Health let for details.    There are no Patient Instructions on file for this visit.    No future appointments.      Lab Frequency Next Occurrence   Comprehensive Metabolic Panel Once 07/05/2023       No follow-ups on file.

## 2023-07-30 PROBLEM — I10 HYPERTENSION: Status: RESOLVED | Noted: 2022-04-28 | Resolved: 2023-07-30

## 2023-09-09 DIAGNOSIS — M1A.0790 IDIOPATHIC CHRONIC GOUT OF FOOT WITHOUT TOPHUS, UNSPECIFIED LATERALITY: ICD-10-CM

## 2023-09-09 NOTE — TELEPHONE ENCOUNTER
No care due was identified.  [facility] Embedded Care Due Messages. Reference number: [id number].   9/09/2023 7:37:51 AM CDT

## 2023-09-10 RX ORDER — COLCHICINE 0.6 MG/1
TABLET ORAL
Qty: 7 TABLET | Refills: 3 | Status: SHIPPED | OUTPATIENT
Start: 2023-09-10 | End: 2023-09-21 | Stop reason: SDUPTHER

## 2023-09-10 NOTE — TELEPHONE ENCOUNTER
Refill Decision Note   Jonathan Barnes  is requesting a refill authorization.  Brief Assessment and Rationale for Refill:  Approve     Medication Therapy Plan:         Comments:     Note composed:11:44 AM 09/10/2023

## 2023-09-13 RX ORDER — ALLOPURINOL 300 MG/1
TABLET ORAL
Refills: 0 | OUTPATIENT
Start: 2023-09-13

## 2023-09-13 NOTE — TELEPHONE ENCOUNTER
Refill Decision Note   Jonathan Barnes  is requesting a refill authorization.  Brief Assessment and Rationale for Refill:  Quick Discontinue     Medication Therapy Plan: Pharmacy is requesting new scripts for the following medications without required information, (sig/ frequency/qty/etc)     Medication Reconciliation Completed: No   Comments:     No Care Gaps recommended.     Note composed:9:13 AM 09/13/2023

## 2023-09-13 NOTE — TELEPHONE ENCOUNTER
No care due was identified.  Health [facility] Embedded Care Due Messages. Reference number: [id number].   9/13/2023 5:12:13 AM CDT

## 2023-09-21 ENCOUNTER — OFFICE VISIT (OUTPATIENT)
Dept: INTERNAL MEDICINE | Facility: CLINIC | Age: 43
End: 2023-09-21
Payer: COMMERCIAL

## 2023-09-21 DIAGNOSIS — G47.9 SLEEPING DIFFICULTY: Primary | ICD-10-CM

## 2023-09-21 DIAGNOSIS — F41.1 GAD (GENERALIZED ANXIETY DISORDER): ICD-10-CM

## 2023-09-21 DIAGNOSIS — M1A.0790 IDIOPATHIC CHRONIC GOUT OF FOOT WITHOUT TOPHUS, UNSPECIFIED LATERALITY: ICD-10-CM

## 2023-09-21 PROCEDURE — 1160F RVW MEDS BY RX/DR IN RCRD: CPT | Mod: CPTII,95,, | Performed by: FAMILY MEDICINE

## 2023-09-21 PROCEDURE — 99214 OFFICE O/P EST MOD 30 MIN: CPT | Mod: 95,,, | Performed by: FAMILY MEDICINE

## 2023-09-21 PROCEDURE — 1160F PR REVIEW ALL MEDS BY PRESCRIBER/CLIN PHARMACIST DOCUMENTED: ICD-10-PCS | Mod: CPTII,95,, | Performed by: FAMILY MEDICINE

## 2023-09-21 PROCEDURE — 99214 PR OFFICE/OUTPT VISIT, EST, LEVL IV, 30-39 MIN: ICD-10-PCS | Mod: 95,,, | Performed by: FAMILY MEDICINE

## 2023-09-21 PROCEDURE — 1159F PR MEDICATION LIST DOCUMENTED IN MEDICAL RECORD: ICD-10-PCS | Mod: CPTII,95,, | Performed by: FAMILY MEDICINE

## 2023-09-21 PROCEDURE — 3044F PR MOST RECENT HEMOGLOBIN A1C LEVEL <7.0%: ICD-10-PCS | Mod: CPTII,95,, | Performed by: FAMILY MEDICINE

## 2023-09-21 PROCEDURE — 1159F MED LIST DOCD IN RCRD: CPT | Mod: CPTII,95,, | Performed by: FAMILY MEDICINE

## 2023-09-21 PROCEDURE — 3044F HG A1C LEVEL LT 7.0%: CPT | Mod: CPTII,95,, | Performed by: FAMILY MEDICINE

## 2023-09-21 RX ORDER — COLCHICINE 0.6 MG/1
TABLET ORAL
Qty: 30 TABLET | Refills: 0 | Status: SHIPPED | OUTPATIENT
Start: 2023-09-21 | End: 2023-11-16 | Stop reason: SDUPTHER

## 2023-09-21 RX ORDER — ZOLPIDEM TARTRATE 5 MG/1
5 TABLET ORAL NIGHTLY PRN
Qty: 10 TABLET | Refills: 0 | Status: SHIPPED | OUTPATIENT
Start: 2023-09-21 | End: 2023-09-26

## 2023-09-21 RX ORDER — HYDROXYZINE HYDROCHLORIDE 25 MG/1
25 TABLET, FILM COATED ORAL 2 TIMES DAILY
COMMUNITY
Start: 2023-09-20 | End: 2023-09-26

## 2023-09-21 NOTE — PROGRESS NOTES
Subjective:   Patient ID: Jonathan Barnes is a 43 y.o. male.  Chief Complaint:  Insomnia    The patient location is: Work  The chief complaint leading to consultation is: Sleep Difficulty    Visit type: audiovisual    Face to Face time with patient: 20 minutes  30 minutes of total time spent on the encounter, which includes face to face time and non-face to face time preparing to see the patient (eg, review of tests), Obtaining and/or reviewing separately obtained history, Documenting clinical information in the electronic or other health record, Independently interpreting results (not separately reported) and communicating results to the patient/family/caregiver, or Care coordination (not separately reported).     Each patient to whom he or she provides medical services by telemedicine is:  (1) informed of the relationship between the physician and patient and the respective role of any other health care provider with respect to management of the patient; and (2) notified that he or she may decline to receive medical services by telemedicine and may withdraw from such care at any time.    Presents for evaluation of sleep difficulty  Concerned he has restless legs syndrome   Symptoms started in past 2 weeks   Symptoms possibly related to increased dose of Prozac 40 mg daily for generalized anxiety disorder   Symptoms have not resolved/improved with decrease back to previously prescribed 20 mg daily dose  Reports took Ambien years ago in the past for similar type problem and was effective  External provider prescribed Vistaril the try but it has not been effective  Most recent clinic visit July 2023 for executive health evaluation  CBC, CMP lipids, TSH, A1c all acceptable    Also needs refill of his gout medication      Review of Systems   Constitutional:  Negative for activity change and unexpected weight change.   HENT:  Negative for hearing loss, rhinorrhea and trouble swallowing.    Eyes:  Negative for discharge  and visual disturbance.   Respiratory:  Negative for chest tightness and wheezing.    Cardiovascular:  Negative for chest pain and palpitations.   Gastrointestinal:  Negative for blood in stool, constipation, diarrhea and vomiting.   Endocrine: Negative for polydipsia and polyuria.   Genitourinary:  Negative for difficulty urinating, hematuria and urgency.   Musculoskeletal:  Negative for arthralgias, joint swelling and neck pain.   Neurological:  Negative for weakness and headaches.   Psychiatric/Behavioral:  Positive for sleep disturbance. Negative for agitation, behavioral problems, confusion, decreased concentration, dysphoric mood and hallucinations. The patient is nervous/anxious. The patient is not hyperactive.      Objective:     Physical Exam  Constitutional:       Appearance: Normal appearance.   Psychiatric:         Mood and Affect: Mood and affect normal.         Behavior: Behavior normal.         Thought Content: Thought content normal.         Judgment: Judgment normal.       Assessment:       ICD-10-CM ICD-9-CM   1. Sleeping difficulty  G47.9 780.50   2. JOSSUE (generalized anxiety disorder)  F41.1 300.02   3. Idiopathic chronic gout of foot without tophus, unspecified laterality  [id number].0790 274.02     Plan:   Sleeping difficulty  -     Ferritin; Future; Expected date: 09/21/2023  -     zolpidem (AMBIEN) 5 MG Tab; Take 1 tablet (5 mg total) by mouth nightly as needed (for Insomnia).  Dispense: 10 tablet; Refill: 0  Regarding possible restless legs syndrome, recommend check ferritin level and if low start iron supplement   Could also consider gabapentin 300 mg nightly prior to possible treatment with Requip or equivalent medication  Discussed in detail sleep difficulty maybe more primarily mental health related with more going on than just generalized anxiety disorder. Especially if Ambien seems to be effective with sleep difficulty.  If Ambien effective for sleep difficulty, may need to consider trazodone,  Seroquel, or other specific mental health sleep aid long-term    JOSSUE (generalized anxiety disorder)  Continue current low-dose Prozac 20 mg daily   Follow-up with external provider as planned     Idiopathic chronic gout of foot without tophus, unspecified laterality  -     colchicine, gout, (COLCRYS) 0.6 mg tablet; TAKE 2 TABLETS BY MOUTH IN THE MORNING AND 1 TABLET IN THE EVENING ON DAY 1 THEN 1 TABLET DAILY ON DAYS 2 THROUGH 5  Dispense: 30 tablet; Refill: 0  Okay to refill colchicine for as needed use with acute attacks  Discussed 30 pills should last 6-12 months   If needs refill sooner, will need to start daily preventative medication    Patient expresses agreement understanding to the above plan    Can schedule telemedicine follow-up visit or provide up-to-date through MyChart on response to above treatment     Return to clinic 9 months for executive Health evaluation or sooner if needed

## 2023-09-22 ENCOUNTER — LAB VISIT (OUTPATIENT)
Dept: LAB | Facility: HOSPITAL | Age: 43
End: 2023-09-22
Attending: FAMILY MEDICINE
Payer: COMMERCIAL

## 2023-09-22 DIAGNOSIS — G47.9 SLEEPING DIFFICULTY: ICD-10-CM

## 2023-09-22 PROCEDURE — 82728 ASSAY OF FERRITIN: CPT | Performed by: FAMILY MEDICINE

## 2023-09-22 PROCEDURE — 36415 COLL VENOUS BLD VENIPUNCTURE: CPT | Mod: PO | Performed by: FAMILY MEDICINE

## 2023-09-23 LAB — FERRITIN SERPL-MCNC: 12 NG/ML (ref 20–300)

## 2023-09-26 ENCOUNTER — OFFICE VISIT (OUTPATIENT)
Dept: INTERNAL MEDICINE | Facility: CLINIC | Age: 43
End: 2023-09-26
Payer: COMMERCIAL

## 2023-09-26 DIAGNOSIS — G47.9 SLEEPING DIFFICULTY: ICD-10-CM

## 2023-09-26 DIAGNOSIS — F41.1 GAD (GENERALIZED ANXIETY DISORDER): ICD-10-CM

## 2023-09-26 DIAGNOSIS — R79.0 LOW FERRITIN LEVEL: Primary | ICD-10-CM

## 2023-09-26 PROCEDURE — 1159F PR MEDICATION LIST DOCUMENTED IN MEDICAL RECORD: ICD-10-PCS | Mod: CPTII,95,, | Performed by: FAMILY MEDICINE

## 2023-09-26 PROCEDURE — 1159F MED LIST DOCD IN RCRD: CPT | Mod: CPTII,95,, | Performed by: FAMILY MEDICINE

## 2023-09-26 PROCEDURE — 3044F PR MOST RECENT HEMOGLOBIN A1C LEVEL <7.0%: ICD-10-PCS | Mod: CPTII,95,, | Performed by: FAMILY MEDICINE

## 2023-09-26 PROCEDURE — 99213 PR OFFICE/OUTPT VISIT, EST, LEVL III, 20-29 MIN: ICD-10-PCS | Mod: 95,,, | Performed by: FAMILY MEDICINE

## 2023-09-26 PROCEDURE — 99213 OFFICE O/P EST LOW 20 MIN: CPT | Mod: 95,,, | Performed by: FAMILY MEDICINE

## 2023-09-26 PROCEDURE — 1160F PR REVIEW ALL MEDS BY PRESCRIBER/CLIN PHARMACIST DOCUMENTED: ICD-10-PCS | Mod: CPTII,95,, | Performed by: FAMILY MEDICINE

## 2023-09-26 PROCEDURE — 3044F HG A1C LEVEL LT 7.0%: CPT | Mod: CPTII,95,, | Performed by: FAMILY MEDICINE

## 2023-09-26 PROCEDURE — 1160F RVW MEDS BY RX/DR IN RCRD: CPT | Mod: CPTII,95,, | Performed by: FAMILY MEDICINE

## 2023-09-26 RX ORDER — GABAPENTIN 300 MG/1
300 CAPSULE ORAL NIGHTLY
Qty: 90 CAPSULE | Refills: 0 | Status: SHIPPED | OUTPATIENT
Start: 2023-09-26 | End: 2023-11-06

## 2023-09-26 RX ORDER — FLUOXETINE HYDROCHLORIDE 40 MG/1
40 CAPSULE ORAL DAILY
COMMUNITY

## 2023-09-26 NOTE — PROGRESS NOTES
Subjective:   Patient ID: Jonathan Barnes is a 43 y.o. male.  Chief Complaint:  No chief complaint on file.    The patient location is: Work  The chief complaint leading to consultation is: Sleeping difficulty    Visit type: audiovisual    Face to Face time with patient: 10 minutes  15 minutes of total time spent on the encounter, which includes face to face time and non-face to face time preparing to see the patient (eg, review of tests), Obtaining and/or reviewing separately obtained history, Documenting clinical information in the electronic or other health record, Independently interpreting results (not separately reported) and communicating results to the patient/family/caregiver, or Care coordination (not separately reported).     Each patient to whom he or she provides medical services by telemedicine is:  (1) informed of the relationship between the physician and patient and the respective role of any other health care provider with respect to management of the patient; and (2) notified that he or she may decline to receive medical services by telemedicine and may withdraw from such care at any time.    Presents for follow-up on sleeping difficulty   Ambien and Vistaril not effective to help with sleep   Based on concern for restless legs syndrome, checked ferritin level   Ferritin level 12 and significantly decreased      Review of Systems   Constitutional:  Negative for activity change and unexpected weight change.   HENT:  Negative for hearing loss, rhinorrhea and trouble swallowing.    Eyes:  Negative for discharge and visual disturbance.   Respiratory:  Negative for chest tightness and wheezing.    Cardiovascular:  Negative for chest pain and palpitations.   Gastrointestinal:  Negative for blood in stool, constipation, diarrhea and vomiting.   Endocrine: Negative for polydipsia and polyuria.   Genitourinary:  Negative for difficulty urinating, hematuria and urgency.   Musculoskeletal:  Negative for  arthralgias, joint swelling and neck pain.   Neurological:  Negative for weakness and headaches.   Psychiatric/Behavioral:  Positive for sleep disturbance. Negative for confusion and dysphoric mood. The patient is nervous/anxious.      Objective:     Physical Exam  Constitutional:       Appearance: Normal appearance.   Psychiatric:         Mood and Affect: Mood and affect normal.         Behavior: Behavior normal.         Thought Content: Thought content normal.         Judgment: Judgment normal.       Assessment:       ICD-10-CM ICD-9-CM   1. Low ferritin level  R79.0 790.6   2. Sleeping difficulty  G47.9 780.50   3. JOSSUE (generalized anxiety disorder)  F41.1 300.02     Plan:   Low ferritin level  Sleeping difficulty  -     iron-vit c-vit b12-folic acid (IRON-C PLUS) tablet; Take 1 tablet by mouth once daily.  Dispense: 90 tablet; Refill: 3  -     gabapentin (NEURONTIN) 300 MG capsule; Take 1 capsule (300 mg total) by mouth every evening.  Dispense: 90 capsule; Refill: 0  Start oral iron supplementation   Discussed could take 2-3 months to see full benefit of treatment regarding possible restless legs syndrome   In interim, trial of gabapentin 300 mg nightly     JOSSUE (generalized anxiety disorder)  Since no change in sleep difficulty on lower dose of medication, recommend increase back to previously prescribed Prozac 40 mg daily dose     Provide up-to-date through [facility] rescheduled telemedicine visit in 3 months regarding sleep difficulty     Otherwise return to clinic 9 months for executive Health evaluation

## 2023-10-03 ENCOUNTER — OFFICE VISIT (OUTPATIENT)
Dept: INTERNAL MEDICINE | Facility: CLINIC | Age: 43
End: 2023-10-03
Payer: COMMERCIAL

## 2023-10-03 DIAGNOSIS — F41.1 GAD (GENERALIZED ANXIETY DISORDER): ICD-10-CM

## 2023-10-03 DIAGNOSIS — R79.0 LOW FERRITIN LEVEL: ICD-10-CM

## 2023-10-03 DIAGNOSIS — G47.9 SLEEPING DIFFICULTY: Primary | ICD-10-CM

## 2023-10-03 PROCEDURE — 99214 PR OFFICE/OUTPT VISIT, EST, LEVL IV, 30-39 MIN: ICD-10-PCS | Mod: 95,,, | Performed by: FAMILY MEDICINE

## 2023-10-03 PROCEDURE — 1160F PR REVIEW ALL MEDS BY PRESCRIBER/CLIN PHARMACIST DOCUMENTED: ICD-10-PCS | Mod: CPTII,95,, | Performed by: FAMILY MEDICINE

## 2023-10-03 PROCEDURE — 99214 OFFICE O/P EST MOD 30 MIN: CPT | Mod: 95,,, | Performed by: FAMILY MEDICINE

## 2023-10-03 PROCEDURE — 1160F RVW MEDS BY RX/DR IN RCRD: CPT | Mod: CPTII,95,, | Performed by: FAMILY MEDICINE

## 2023-10-03 PROCEDURE — 3044F HG A1C LEVEL LT 7.0%: CPT | Mod: CPTII,95,, | Performed by: FAMILY MEDICINE

## 2023-10-03 PROCEDURE — 1159F MED LIST DOCD IN RCRD: CPT | Mod: CPTII,95,, | Performed by: FAMILY MEDICINE

## 2023-10-03 PROCEDURE — 3044F PR MOST RECENT HEMOGLOBIN A1C LEVEL <7.0%: ICD-10-PCS | Mod: CPTII,95,, | Performed by: FAMILY MEDICINE

## 2023-10-03 PROCEDURE — 1159F PR MEDICATION LIST DOCUMENTED IN MEDICAL RECORD: ICD-10-PCS | Mod: CPTII,95,, | Performed by: FAMILY MEDICINE

## 2023-10-03 RX ORDER — ALLOPURINOL 300 MG/1
300 TABLET ORAL DAILY
COMMUNITY
Start: 2023-09-29

## 2023-10-03 RX ORDER — CLONAZEPAM 1 MG/1
1 TABLET ORAL NIGHTLY
Qty: 10 TABLET | Refills: 0 | Status: SHIPPED | OUTPATIENT
Start: 2023-10-03 | End: 2023-10-09 | Stop reason: SDUPTHER

## 2023-10-03 NOTE — PROGRESS NOTES
Subjective:   Patient ID: Jonathan Barnes is a 43 y.o. male.  Chief Complaint:  No chief complaint on file.    The patient location is: Work  The chief complaint leading to consultation is: Sleep Difficulty    Visit type: audiovisual    Face to Face time with patient: 10 minutes  15 minutes of total time spent on the encounter, which includes face to face time and non-face to face time preparing to see the patient (eg, review of tests), Obtaining and/or reviewing separately obtained history, Documenting clinical information in the electronic or other health record, Independently interpreting results (not separately reported) and communicating results to the patient/family/caregiver, or Care coordination (not separately reported).     Each patient to whom he or she provides medical services by telemedicine is:  (1) informed of the relationship between the physician and patient and the respective role of any other health care provider with respect to management of the patient; and (2) notified that he or she may decline to receive medical services by telemedicine and may withdraw from such care at any time.    Presents for follow-up on sleeping difficulty   Last visit telemedicine 1 week ago     - Low ferritin level  - Sleeping difficulty  Possible restless leg syndrome  Start oral iron supplementation   Discussed could take 2-3 months to see full benefit of treatment regarding possible restless legs syndrome   In interim, trial of gabapentin 300 mg nightly   Reports compliance with both prescriptions  Unfortunately no improvement in overall sleep pattern     JOSSUE (generalized anxiety disorder)  Followed by external provider   Continues to deny any previous diagnosis or treatment for bipolar disorder   Did increase back to Prozac 40 mg daily dose since last visit    Review of Systems   Constitutional:  Negative for activity change, fatigue and unexpected weight change.   HENT:  Negative for hearing loss, rhinorrhea and  trouble swallowing.    Eyes:  Negative for discharge and visual disturbance.   Respiratory:  Negative for chest tightness and wheezing.    Cardiovascular:  Negative for chest pain and palpitations.   Gastrointestinal:  Negative for blood in stool, constipation, diarrhea and vomiting.   Endocrine: Negative for polydipsia and polyuria.   Genitourinary:  Negative for difficulty urinating, hematuria and urgency.   Musculoskeletal:  Negative for arthralgias, joint swelling and neck pain.   Neurological:  Negative for weakness and headaches.   Psychiatric/Behavioral:  Positive for sleep disturbance. Negative for agitation, behavioral problems, confusion, decreased concentration, dysphoric mood and hallucinations. The patient is not nervous/anxious and is not hyperactive.        Objective:   There were no vitals taken for this visit.    Physical Exam  Constitutional:       Appearance: Normal appearance.   Psychiatric:         Attention and Perception: Attention normal.         Mood and Affect: Mood and affect normal.         Speech: Speech normal.         Behavior: Behavior is cooperative.         Thought Content: Thought content normal.         Cognition and Memory: Cognition and memory normal.         Judgment: Judgment normal.       Assessment:       ICD-10-CM ICD-9-CM   1. Sleeping difficulty  G47.9 780.50   2. JOSSUE (generalized anxiety disorder)  F41.1 300.02   3. Low ferritin level  R79.0 790.6     Plan:   Sleeping difficulty  JOSSUE (generalized anxiety disorder)  -     clonazePAM (KLONOPIN) 1 MG tablet; Take 1 tablet (1 mg total) by mouth every evening.  Dispense: 10 tablet; Refill: 0  Rediscussed concern for sleep difficulty being a predominant symptom of underlying bipolar disorder   Do not want to prescribe Requip or Mirapex and potentially make mood worse   Would prefer Seroquel or other agent if indicated be prescribed by his psychiatrist   For short term relief try Klonopin 1 mg nightly  Discuss concerns for  possible bipolar disorder with external mental health provider   If they do not recommend/prescribe any additional medication for sleep and feel no bipolar mood disorder likely, can then try Mirapex or Requip in place of Klonopin    Low ferritin level  Continue iron supplement daily  Rediscussed could take 3-4 months to see full effect of medication if sleep difficulty issues restless leg related    Reassess 1 week   Telemedicine visit appropriate if patient prefers

## 2023-10-09 ENCOUNTER — OFFICE VISIT (OUTPATIENT)
Dept: INTERNAL MEDICINE | Facility: CLINIC | Age: 43
End: 2023-10-09
Payer: COMMERCIAL

## 2023-10-09 DIAGNOSIS — R79.0 LOW FERRITIN LEVEL: ICD-10-CM

## 2023-10-09 DIAGNOSIS — G47.9 SLEEPING DIFFICULTY: Primary | ICD-10-CM

## 2023-10-09 DIAGNOSIS — F41.1 GAD (GENERALIZED ANXIETY DISORDER): ICD-10-CM

## 2023-10-09 PROCEDURE — 1160F RVW MEDS BY RX/DR IN RCRD: CPT | Mod: CPTII,95,, | Performed by: FAMILY MEDICINE

## 2023-10-09 PROCEDURE — 3044F PR MOST RECENT HEMOGLOBIN A1C LEVEL <7.0%: ICD-10-PCS | Mod: CPTII,95,, | Performed by: FAMILY MEDICINE

## 2023-10-09 PROCEDURE — 1159F MED LIST DOCD IN RCRD: CPT | Mod: CPTII,95,, | Performed by: FAMILY MEDICINE

## 2023-10-09 PROCEDURE — 99214 OFFICE O/P EST MOD 30 MIN: CPT | Mod: 95,,, | Performed by: FAMILY MEDICINE

## 2023-10-09 PROCEDURE — 1159F PR MEDICATION LIST DOCUMENTED IN MEDICAL RECORD: ICD-10-PCS | Mod: CPTII,95,, | Performed by: FAMILY MEDICINE

## 2023-10-09 PROCEDURE — 3044F HG A1C LEVEL LT 7.0%: CPT | Mod: CPTII,95,, | Performed by: FAMILY MEDICINE

## 2023-10-09 PROCEDURE — 99214 PR OFFICE/OUTPT VISIT, EST, LEVL IV, 30-39 MIN: ICD-10-PCS | Mod: 95,,, | Performed by: FAMILY MEDICINE

## 2023-10-09 PROCEDURE — 1160F PR REVIEW ALL MEDS BY PRESCRIBER/CLIN PHARMACIST DOCUMENTED: ICD-10-PCS | Mod: CPTII,95,, | Performed by: FAMILY MEDICINE

## 2023-10-09 RX ORDER — CLONAZEPAM 1 MG/1
1 TABLET ORAL NIGHTLY PRN
Qty: 30 TABLET | Refills: 0 | Status: SHIPPED | OUTPATIENT
Start: 2023-10-09 | End: 2023-11-06

## 2023-10-09 NOTE — PROGRESS NOTES
Subjective:   Patient ID: Jonathan Barnes is a 43 y.o. male.  Chief Complaint:  Insomnia and Follow-up    The patient location is: Work  The chief complaint leading to consultation is: Insomnia Follow Up    Visit type: audiovisual    Face to Face time with patient: 10 minutes  15 minutes of total time spent on the encounter, which includes face to face time and non-face to face time preparing to see the patient (eg, review of tests), Obtaining and/or reviewing separately obtained history, Documenting clinical information in the electronic or other health record, Independently interpreting results (not separately reported) and communicating results to the patient/family/caregiver, or Care coordination (not separately reported).     Each patient to whom he or she provides medical services by telemedicine is:  (1) informed of the relationship between the physician and patient and the respective role of any other health care provider with respect to management of the patient; and (2) notified that he or she may decline to receive medical services by telemedicine and may withdraw from such care at any time.    Presents for follow-up on insomnia and also has diagnosis of generalized anxiety disorder  Last visit via telemedicine 1 week ago  Started daily iron supplement for low ferritin level on possible restless legs syndrome, but was still having significant sleep difficulty  Also was compliant with Prozac 40 mg increase dose prescribed by external mental health provider  At that visit re discussed concern for sleep difficulty being a predominant symptom of underlying bipolar disorder   Do not want to prescribe Requip or Mirapex and potentially make mood worse   Would prefer Seroquel or other agent if indicated be prescribed by his psychiatrist   For short term relief try Klonopin 1 mg nightly  Discuss concerns for possible bipolar disorder with external mental health provider   If they do not recommend/prescribe any  additional medication for sleep and feel no bipolar mood disorder likely, can then try Mirapex or Requip if Klonopin not effective   Reports is taking Klonopin 2-3 times since last visit   Highly effective for achieving sleep and maintaining sleep   Feels well rested the next day with no significant sedative side effects   Discussed with external mental health provider who feels his primary diagnosis is generalized anxiety disorder and he does not have any significant underlying bipolar disorder      Review of Systems   Constitutional:  Negative for activity change and unexpected weight change.   HENT:  Negative for hearing loss, rhinorrhea and trouble swallowing.    Eyes:  Negative for discharge and visual disturbance.   Respiratory:  Negative for chest tightness and wheezing.    Cardiovascular:  Negative for chest pain and palpitations.   Gastrointestinal:  Negative for blood in stool, constipation, diarrhea and vomiting.   Endocrine: Negative for polydipsia and polyuria.   Genitourinary:  Negative for difficulty urinating, hematuria and urgency.   Musculoskeletal:  Negative for arthralgias, joint swelling and neck pain.   Neurological:  Negative for weakness and headaches.   Psychiatric/Behavioral:  Positive for sleep disturbance. Negative for agitation, behavioral problems, confusion, decreased concentration, dysphoric mood and hallucinations. The patient is not nervous/anxious and is not hyperactive.        Objective:     Physical Exam  Constitutional:       Appearance: Normal appearance.   Psychiatric:         Mood and Affect: Mood and affect normal.       Assessment:       ICD-10-CM ICD-9-CM   1. Sleeping difficulty  G47.9 780.50   2. JOSSUE (generalized anxiety disorder)  F41.1 300.02   3. Low ferritin level  R79.0 790.6     Plan:   Sleeping difficulty  -     clonazePAM (KLONOPIN) 1 MG tablet; Take 1 tablet (1 mg total) by mouth nightly as needed for Anxiety (or Insomnia).  Dispense: 30 tablet; Refill:  0  Discussed risks/benefits of treatment with benzodiazepines   For now continue Klonopin 1 mg nightly as needed  Will reassess in 4-6 weeks   If still with significant sleep difficulty than, consider change to Requip or Mirapex    JOSSUE (generalized anxiety disorder)  Continue Prozac 40 mg daily as prescribed by external provider   Follow-up with mental health provider as scheduled     Low ferritin level  Continue daily iron supplement     Return to clinic 4-6 weeks   Telemedicine visit appropriate if patient prefers

## 2023-11-06 ENCOUNTER — OFFICE VISIT (OUTPATIENT)
Dept: INTERNAL MEDICINE | Facility: CLINIC | Age: 43
End: 2023-11-06
Payer: COMMERCIAL

## 2023-11-06 DIAGNOSIS — R79.0 LOW FERRITIN LEVEL: ICD-10-CM

## 2023-11-06 DIAGNOSIS — G25.81 RESTLESS LEG SYNDROME: Primary | ICD-10-CM

## 2023-11-06 DIAGNOSIS — F41.1 GAD (GENERALIZED ANXIETY DISORDER): ICD-10-CM

## 2023-11-06 PROCEDURE — 99214 PR OFFICE/OUTPT VISIT, EST, LEVL IV, 30-39 MIN: ICD-10-PCS | Mod: 95,,, | Performed by: FAMILY MEDICINE

## 2023-11-06 PROCEDURE — 99214 OFFICE O/P EST MOD 30 MIN: CPT | Mod: 95,,, | Performed by: FAMILY MEDICINE

## 2023-11-06 PROCEDURE — 3044F HG A1C LEVEL LT 7.0%: CPT | Mod: CPTII,95,, | Performed by: FAMILY MEDICINE

## 2023-11-06 PROCEDURE — 3044F PR MOST RECENT HEMOGLOBIN A1C LEVEL <7.0%: ICD-10-PCS | Mod: CPTII,95,, | Performed by: FAMILY MEDICINE

## 2023-11-06 RX ORDER — ROPINIROLE 0.5 MG/1
TABLET, FILM COATED ORAL
Qty: 30 TABLET | Refills: 0 | Status: SHIPPED | OUTPATIENT
Start: 2023-11-06 | End: 2023-11-16 | Stop reason: SDUPTHER

## 2023-11-06 NOTE — PROGRESS NOTES
Subjective:   Patient ID: Jonathan Barnes is a 43 y.o. male.    The patient location is: Work  The chief complaint leading to consultation is:Restless Leg Syndrome    Visit type: audiovisual    Face to Face time with patient: 10 minutes  15 minutes of total time spent on the encounter, which includes face to face time and non-face to face time preparing to see the patient (eg, review of tests), Obtaining and/or reviewing separately obtained history, Documenting clinical information in the electronic or other health record, Independently interpreting results (not separately reported) and communicating results to the patient/family/caregiver, or Care coordination (not separately reported).     Each patient to whom he or she provides medical services by telemedicine is:  (1) informed of the relationship between the physician and patient and the respective role of any other health care provider with respect to management of the patient; and (2) notified that he or she may decline to receive medical services by telemedicine and may withdraw from such care at any time.    Follow-up sleep difficulty and probable restless legs syndrome  Last visit October 2023   Patient with low ferritin level in compliant with daily iron supplement  To help with sleep difficulty in the short term discussed risks/benefits of treatment with benzodiazepines   Continued Klonopin 1 mg nightly as needed  Advised follow-up 4-6 weeks  If still with significant sleep difficulty, would consider starting treatment with Requip or Mirapex.  Reports compliance supplement  The stop taking Klonopin since became only minimally effective  Did not have any improvement tolerate gabapentin in the past  Is agreeable to starting treatment with request     Also with history of JOSSUE (generalized anxiety disorder)  Followed by external mental health provider   Patient reported that provider did not think any of his difficulties were related to bipolar disorder  dysthymia  On Prozac 40 mg daily   Reports compliance.  Denies side effects.  States anxiety symptoms stable    Review of Systems   Constitutional:  Negative for activity change and unexpected weight change.   HENT:  Negative for hearing loss, rhinorrhea and trouble swallowing.    Eyes:  Negative for discharge and visual disturbance.   Respiratory:  Negative for chest tightness and wheezing.    Cardiovascular:  Negative for chest pain and palpitations.   Gastrointestinal:  Negative for blood in stool, constipation, diarrhea and vomiting.   Endocrine: Negative for polydipsia and polyuria.   Genitourinary:  Negative for difficulty urinating, hematuria and urgency.   Musculoskeletal:  Positive for myalgias. Negative for arthralgias, joint swelling and neck pain.   Neurological:  Negative for dizziness, tremors, seizures, syncope, facial asymmetry, speech difficulty, weakness, light-headedness, numbness and headaches.   Psychiatric/Behavioral:  Positive for sleep disturbance. Negative for confusion and dysphoric mood.      Objective:       Physical Exam  Constitutional:       Appearance: Normal appearance.   Psychiatric:         Mood and Affect: Mood and affect normal.         Behavior: Behavior normal.         Thought Content: Thought content normal.         Judgment: Judgment normal.       Assessment:       ICD-10-CM ICD-9-CM   1. Restless leg syndrome  G25.81 333.94   2. Low ferritin level  R79.0 790.6   3. JOSSUE (generalized anxiety disorder)  F41.1 300.02     Plan:   Restless leg syndrome  Low ferritin level  -     rOPINIRole (REQUIP) 0.5 MG tablet; Take 0.5 mg by mouth nightly for 7 days, then increase to 1 mg by mouth nightly  Dispense: 30 tablet; Refill: 0  Minimal improvement with iron supplement   Did not tolerate gabapentin  Trial of Requip  Start 0.5 mg daily dose and increase weekly to 2 mg or 3 mg daily dose    JOSSUE (generalized anxiety disorder)  Stable, no side effects, mood and symptoms well controlled on  present medication .  Continue Prozac 40 mg daily    Return to clinic 3 months or sooner as needed  Telemedicine visit appropriate if patient prefers

## 2023-11-16 DIAGNOSIS — G25.81 RESTLESS LEG SYNDROME: ICD-10-CM

## 2023-11-16 DIAGNOSIS — M1A.0790 IDIOPATHIC CHRONIC GOUT OF FOOT WITHOUT TOPHUS, UNSPECIFIED LATERALITY: ICD-10-CM

## 2023-11-17 RX ORDER — COLCHICINE 0.6 MG/1
TABLET ORAL
Qty: 30 TABLET | Refills: 0 | Status: SHIPPED | OUTPATIENT
Start: 2023-11-17

## 2023-11-17 RX ORDER — ROPINIROLE 1 MG/1
1 TABLET, FILM COATED ORAL NIGHTLY
Qty: 90 TABLET | Refills: 0 | Status: SHIPPED | OUTPATIENT
Start: 2023-11-17 | End: 2024-02-06 | Stop reason: SDUPTHER

## 2024-01-22 ENCOUNTER — ON-DEMAND VIRTUAL (OUTPATIENT)
Dept: URGENT CARE | Facility: CLINIC | Age: 44
End: 2024-01-22
Payer: COMMERCIAL

## 2024-01-22 DIAGNOSIS — J01.90 ACUTE BACTERIAL SINUSITIS: Primary | ICD-10-CM

## 2024-01-22 DIAGNOSIS — H10.9 CONJUNCTIVITIS, UNSPECIFIED CONJUNCTIVITIS TYPE, UNSPECIFIED LATERALITY: ICD-10-CM

## 2024-01-22 DIAGNOSIS — B96.89 ACUTE BACTERIAL SINUSITIS: Primary | ICD-10-CM

## 2024-01-22 PROCEDURE — 99212 OFFICE O/P EST SF 10 MIN: CPT | Mod: 95,,, | Performed by: NURSE PRACTITIONER

## 2024-01-22 RX ORDER — AZITHROMYCIN 250 MG/1
TABLET, FILM COATED ORAL
Qty: 6 EACH | Refills: 0 | Status: SHIPPED | OUTPATIENT
Start: 2024-01-22 | End: 2024-02-06

## 2024-01-22 RX ORDER — OFLOXACIN 3 MG/ML
1 SOLUTION/ DROPS OPHTHALMIC 4 TIMES DAILY
Qty: 3 ML | Refills: 0 | Status: SHIPPED | OUTPATIENT
Start: 2024-01-22 | End: 2024-06-04

## 2024-01-22 NOTE — PROGRESS NOTES
Subjective:      Patient ID: Jonathan Barnes is a 43 y.o. male.    Vitals:  vitals were not taken for this visit.     Chief Complaint: Sinus Problem      Visit Type: TELE AUDIOVISUAL    Present with the patient at the time of consultation: TELEMED PRESENT WITH PATIENT: None    Past Medical History:   Diagnosis Date    Gout     Hypertension 4/28/2022     Past Surgical History:   Procedure Laterality Date    ADENOIDECTOMY      REPAIR OF TYMPANIC MEMBRANE USING PATCH      TONSILLECTOMY       Review of patient's allergies indicates:   Allergen Reactions    Penicillins      Current Outpatient Medications on File Prior to Visit   Medication Sig Dispense Refill    allopurinoL (ZYLOPRIM) 300 MG tablet Take 300 mg by mouth once daily.      colchicine (COLCRYS) 0.6 mg tablet TAKE 2 TABLETS BY MOUTH IN THE MORNING AND 1 TABLET IN THE EVENING ON DAY 1 THEN 1 TABLET DAILY ON DAYS 2 THROUGH 5 30 tablet 0    FLUoxetine 40 MG capsule Take 40 mg by mouth once daily.      loratadine (CLARITIN) 10 mg tablet       TESTOSTERONE IM Inject into the muscle twice a week.       No current facility-administered medications on file prior to visit.     Family History   Problem Relation Age of Onset    Nephrolithiasis Mother     Heart disease Mother     Arthritis Mother     Gout Father     Arthritis Father     Cancer Maternal Grandfather        Medications Ordered                [id number] - [place], [place] - [address] [facility]   [address] [address] [address]    Telephone: [phone]   Fax: [phone]   Hours: Not open 24 hours                         E-Prescribed (1 of 1)              ofloxacin (OCUFLOX) 0.3 % ophthalmic solution    Sig: Place 1 drop into both eyes 4 (four) times daily.       Start: 1/22/24     Quantity: 3 mL Refills: 0                           Ohs Peq Odvv Intake    1/22/2024  9:13 AM CST - Filed by Patient   What is your current physical address in the event of a medical emergency? [address] [facility]   Are you able to take your vital signs? No   Please attach any relevant images or files          Sinus Problem  Associated symptoms include congestion and a sore throat. Pertinent negatives include no chills, coughing, diaphoresis, ear pain, headaches, neck pain, shortness of breath or sinus pressure.       Constitution: Negative for chills, sweating, fatigue and fever.   HENT:  Positive for congestion, postnasal drip and sore throat. Negative for ear pain, ear discharge, sinus pain, sinus pressure, trouble swallowing and voice change.    Neck: Negative for neck pain and painful lymph nodes.   Cardiovascular:  Negative for chest pain, palpitations and sob on exertion.   Respiratory:  Negative for cough, sputum production, shortness of breath and wheezing.    Gastrointestinal:  Negative for abdominal pain, nausea, vomiting and diarrhea.   Musculoskeletal:  Negative for muscle ache.   Skin:  Negative for pale and rash.   Neurological:  Negative for headaches.   Hematologic/Lymphatic: Negative for swollen lymph nodes.        Objective:   The physical exam was conducted virtually.  Physical Exam   Constitutional: He is oriented to person, place, and time. No distress.   HENT:   Head: Normocephalic and atraumatic.   Mouth/Throat: Oropharynx is clear and moist and mucous membranes are normal.   Eyes: Conjunctivae are normal. No scleral icterus.   Pulmonary/Chest: Effort normal. No respiratory distress.   Musculoskeletal: Normal range of motion.         General: Normal range of motion.   Neurological: He is alert and oriented to person, place, and time.   Skin: Skin is not diaphoretic.   Psychiatric: His behavior is normal. Judgment and thought content normal.   Vitals reviewed.      Assessment:     1. Acute bacterial sinusitis    2. Conjunctivitis, unspecified conjunctivitis type, unspecified laterality        Plan:       Acute bacterial sinusitis  -     Discontinue: azithromycin (Z-AMY) 250  MG tablet; Take 2 tablets by mouth on day 1; Take 1 tablet by mouth on days 2-5  Dispense: 6 each; Refill: 0    Conjunctivitis, unspecified conjunctivitis type, unspecified laterality  -     ofloxacin (OCUFLOX) 0.3 % ophthalmic solution; Place 1 drop into both eyes 4 (four) times daily.  Dispense: 3 mL; Refill: 0                  Patient Instructions   See additional patient Instructions provided    - Rest.    - Drink plenty of fluids.  - Bacterial infections can be treated with oral antibiotics, however, Viral upper respiratory infections will not respond to antibiotics but with simple symptomatic care, typically run their course in 10-14 days.     - Tylenol or Ibuprofen as directed as needed for fever/pain. Avoid tylenol if you have a history of liver disease. Do not take ibuprofen if you have a history of GI bleeding, kidney disease, or if you take blood thinners.     - You can take over-the-counter claritin, zyrtec, allegra, or xyzal as directed. These are antihistamines that can help with runny nose, nasal congestion, sneezing, and helps to dry up post-nasal drip, which usually causes sore throat and cough.   - If you do NOT have high blood pressure, you may use a decongestant form (D)  of this medication (ie. Claritin- D, zyrtec-D, allegra-D) or if you do not take the D form, you can take sudafed (pseudoephedrine) over the counter, which is a decongestant. Do NOT take two decongestant (D) medications at the same time (such as mucinex-D and claritin-D or plain sudafed and claritin D)    - You can use Flonase (fluticasone) nasal spray as directed for sinus congestion and postnasal drip. This is a steroid nasal spray that works locally over time to decrease the inflammation in your nose/sinuses and help with allergic symptoms. This is not an quick- relief spray like afrin, but it works well if used daily.  Discontinue if you develop nose bleed  - use nasal saline prior to Flonase.  - Use Ocean Spray Nasal Saline  1-3 puffs each nostril every 2-3 hours then blow out onto tissue. This is to irrigate the nasal passage way to clear the sinus openings. Use until sinus problem resolved.    - you can take plain Mucinex (guaifenesin) 1200 mg twice a day to help loosen mucous.     -warm salt water gargles can help with sore throat    - warm tea with honey can help with cough. Honey is a natural cough suppressant.    - Dextromethorphan (DM) is a cough suppressant over the counter (ie. mucinex DM, robitussin, delsym; dayquil/nyquil has DM as well.)    - Follow up with your PCP or specialty clinic as directed in the next 1-2 weeks if not improved or as needed.  You can call [phone] to schedule an appointment with the appropriate provider.      - Go to the ER if you develop new or worsening symptoms.     - You must understand that you have received an Urgent Care treatment only and that you may be released before all of your medical problems are known or treated.   - You, the patient, will arrange for follow up care as instructed.   - If your condition worsens or fails to improve we recommend that you receive another evaluation at the ER immediately or contact your PCP to discuss your concerns or return here.     Bacterial conjunctivitis is contagious.  Wash hands frequently and especially after touching the affected eye  Use prescribed eye drops as directed   May cleanse with Miguel Angel & Miguel Angel baby shampoo  Warm compresses as needed for comfort  Advised on return/follow-up precautions: Follow up with Ophthalmology in 24 hours if not improved  Advised on ER precautions.   Answered all patient questions. Patient verbalized understanding and voiced agreement with current treatment plan.     Patient Instructions   - You must understand that you have received an Urgent Care treatment only and that you may be released before all of your medical problems are known or treated.   - You, the patient, will arrange for follow up care as  instructed.   - If your condition worsens or fails to improve we recommend that you receive another evaluation at the ER immediately or contact your PCP to discuss your concerns or return here.     Advised on return/follow-up precautions. Advised on ER precautions. Answered all patient questions. Patient verbalized understanding and voiced agreement with current treatment plan.

## 2024-01-22 NOTE — PATIENT INSTRUCTIONS
See additional patient Instructions provided    - Rest.    - Drink plenty of fluids.  - Bacterial infections can be treated with oral antibiotics, however, Viral upper respiratory infections will not respond to antibiotics but with simple symptomatic care, typically run their course in 10-14 days.     - Tylenol or Ibuprofen as directed as needed for fever/pain. Avoid tylenol if you have a history of liver disease. Do not take ibuprofen if you have a history of GI bleeding, kidney disease, or if you take blood thinners.     - You can take over-the-counter claritin, zyrtec, allegra, or xyzal as directed. These are antihistamines that can help with runny nose, nasal congestion, sneezing, and helps to dry up post-nasal drip, which usually causes sore throat and cough.   - If you do NOT have high blood pressure, you may use a decongestant form (D)  of this medication (ie. Claritin- D, zyrtec-D, allegra-D) or if you do not take the D form, you can take sudafed (pseudoephedrine) over the counter, which is a decongestant. Do NOT take two decongestant (D) medications at the same time (such as mucinex-D and claritin-D or plain sudafed and claritin D)    - You can use Flonase (fluticasone) nasal spray as directed for sinus congestion and postnasal drip. This is a steroid nasal spray that works locally over time to decrease the inflammation in your nose/sinuses and help with allergic symptoms. This is not an quick- relief spray like afrin, but it works well if used daily.  Discontinue if you develop nose bleed  - use nasal saline prior to Flonase.  - Use Ocean Spray Nasal Saline 1-3 puffs each nostril every 2-3 hours then blow out onto tissue. This is to irrigate the nasal passage way to clear the sinus openings. Use until sinus problem resolved.    - you can take plain Mucinex (guaifenesin) 1200 mg twice a day to help loosen mucous.     -warm salt water gargles can help with sore throat    - warm tea with honey can help with  cough. Honey is a natural cough suppressant.    - Dextromethorphan (DM) is a cough suppressant over the counter (ie. mucinex DM, robitussin, delsym; dayquil/nyquil has DM as well.)    - Follow up with your PCP or specialty clinic as directed in the next 1-2 weeks if not improved or as needed.  You can call [phone] to schedule an appointment with the appropriate provider.      - Go to the ER if you develop new or worsening symptoms.     - You must understand that you have received an Urgent Care treatment only and that you may be released before all of your medical problems are known or treated.   - You, the patient, will arrange for follow up care as instructed.   - If your condition worsens or fails to improve we recommend that you receive another evaluation at the ER immediately or contact your PCP to discuss your concerns or return here.     Bacterial conjunctivitis is contagious.  Wash hands frequently and especially after touching the affected eye  Use prescribed eye drops as directed   May cleanse with Miguel Angel & Miguel Angel baby shampoo  Warm compresses as needed for comfort  Advised on return/follow-up precautions: Follow up with Ophthalmology in 24 hours if not improved  Advised on ER precautions.   Answered all patient questions. Patient verbalized understanding and voiced agreement with current treatment plan.     Patient Instructions   - You must understand that you have received an Urgent Care treatment only and that you may be released before all of your medical problems are known or treated.   - You, the patient, will arrange for follow up care as instructed.   - If your condition worsens or fails to improve we recommend that you receive another evaluation at the ER immediately or contact your PCP to discuss your concerns or return here.     Advised on return/follow-up precautions. Advised on ER precautions. Answered all patient questions. Patient verbalized understanding and voiced agreement with  current treatment plan.

## 2024-02-06 ENCOUNTER — OFFICE VISIT (OUTPATIENT)
Dept: INTERNAL MEDICINE | Facility: CLINIC | Age: 44
End: 2024-02-06
Payer: COMMERCIAL

## 2024-02-06 DIAGNOSIS — F41.1 GAD (GENERALIZED ANXIETY DISORDER): ICD-10-CM

## 2024-02-06 DIAGNOSIS — R79.0 LOW FERRITIN LEVEL: ICD-10-CM

## 2024-02-06 DIAGNOSIS — G25.81 RESTLESS LEG SYNDROME: Primary | ICD-10-CM

## 2024-02-06 PROCEDURE — 99214 OFFICE O/P EST MOD 30 MIN: CPT | Mod: 95,,, | Performed by: FAMILY MEDICINE

## 2024-02-06 PROCEDURE — 1160F RVW MEDS BY RX/DR IN RCRD: CPT | Mod: CPTII,95,, | Performed by: FAMILY MEDICINE

## 2024-02-06 PROCEDURE — 1159F MED LIST DOCD IN RCRD: CPT | Mod: CPTII,95,, | Performed by: FAMILY MEDICINE

## 2024-02-06 RX ORDER — ROPINIROLE 1 MG/1
1 TABLET, FILM COATED ORAL NIGHTLY
Qty: 90 TABLET | Refills: 0 | Status: SHIPPED | OUTPATIENT
Start: 2024-02-06 | End: 2024-02-28 | Stop reason: SDUPTHER

## 2024-02-06 NOTE — PROGRESS NOTES
Subjective:   Patient ID: Jonathan Barnes is a 43 y.o. male.  Chief Complaint:  Follow-up    The patient location is: Work  The chief complaint leading to consultation is: RLS Follow Up    Visit type: audiovisual    Face to Face time with patient: 10 minutes  15 minutes of total time spent on the encounter, which includes face to face time and non-face to face time preparing to see the patient (eg, review of tests), Obtaining and/or reviewing separately obtained history, Documenting clinical information in the electronic or other health record, Independently interpreting results (not separately reported) and communicating results to the patient/family/caregiver, or Care coordination (not separately reported).     Each patient to whom he or she provides medical services by telemedicine is:  (1) informed of the relationship between the physician and patient and the respective role of any other health care provider with respect to management of the patient; and (2) notified that he or she may decline to receive medical services by telemedicine and may withdraw from such care at any time.    Last visit November 2023  - Restless leg syndrome  - Low ferritin level  -     rOPINIRole (REQUIP) 0.5 MG tablet; Take 0.5 mg by mouth nightly for 7 days, then increase to 1 mg by mouth nightly  Dispense: 30 tablet;   Only minimal improvement with iron supplement   Did not tolerate gabapentin  Trial of Requip  Started 0.5 mg daily dose and increased after 1 week to 1 mg daily dose   Reports compliance.  Denies side effects.    Medication remains effective and symptoms well controlled  Now questioning if he needs to take a daily iron and the possibility of stopping the Requip in the future  CBC July 2023 was normal without any anemia      Review of Systems   Constitutional:  Negative for activity change and unexpected weight change.   HENT:  Negative for hearing loss, rhinorrhea and trouble swallowing.    Eyes:  Negative for  discharge and visual disturbance.   Respiratory:  Negative for chest tightness and wheezing.    Cardiovascular:  Negative for chest pain and palpitations.   Gastrointestinal:  Negative for blood in stool, constipation, diarrhea and vomiting.   Endocrine: Negative for polydipsia and polyuria.   Genitourinary:  Negative for difficulty urinating, hematuria and urgency.   Musculoskeletal:  Negative for arthralgias, joint swelling, myalgias and neck pain.   Neurological:  Negative for weakness and headaches.   Psychiatric/Behavioral:  Negative for confusion, dysphoric mood and sleep disturbance.      Objective:     Physical Exam  Constitutional:       Appearance: Normal appearance.   Psychiatric:         Mood and Affect: Mood and affect normal.         Assessment:       ICD-10-CM ICD-9-CM   1. Restless leg syndrome  G25.81 333.94   2. Low ferritin level  R79.0 790.6   3. JOSSUE (generalized anxiety disorder)  F41.1 300.02     Plan:   Restless leg syndrome  -     rOPINIRole (REQUIP) 1 MG tablet; Take 1 tablet (1 mg total) by mouth every evening.  Dispense: 90 tablet; Refill: 0  Symptoms improved/controlled/stable on current medication   No significant side effects from medication   No contraindications to continuing medication  Refill Requip 1 mg nightly   Advised to try and discontinue iron 1st to make sure symptoms not returned   If desires before next visit, can decrease to 0.5 mg nightly dose and if symptoms do not recur after 2-4 weeks, could try discontinue medication altogether     Low ferritin level  Reviewed chart   July 2023 CBC without anemia   Okay to discontinue iron supplement   If symptoms recur, will need to restart iron supplement     Generalized anxiety disorder   Stable, no side effects, mood and symptoms well controlled on present medication .  Continue Prozac 40 mg daily  Continue counseling/therapy     Return to clinic 6 months for executive Health evaluation or sooner as needed

## 2024-02-08 DIAGNOSIS — Z00.00 ROUTINE GENERAL MEDICAL EXAMINATION AT A HEALTH CARE FACILITY: Primary | ICD-10-CM

## 2024-02-21 DIAGNOSIS — M25.511 RIGHT SHOULDER PAIN, UNSPECIFIED CHRONICITY: Primary | ICD-10-CM

## 2024-02-22 ENCOUNTER — HOSPITAL ENCOUNTER (OUTPATIENT)
Dept: RADIOLOGY | Facility: HOSPITAL | Age: 44
Discharge: HOME | End: 2024-02-22
Attending: STUDENT IN AN ORGANIZED HEALTH CARE EDUCATION/TRAINING PROGRAM
Payer: COMMERCIAL

## 2024-02-22 ENCOUNTER — OFFICE VISIT (OUTPATIENT)
Dept: SPORTS MEDICINE | Facility: CLINIC | Age: 44
End: 2024-02-22
Payer: COMMERCIAL

## 2024-02-22 DIAGNOSIS — M25.511 RIGHT SHOULDER PAIN, UNSPECIFIED CHRONICITY: ICD-10-CM

## 2024-02-22 DIAGNOSIS — M75.41 IMPINGEMENT SYNDROME OF RIGHT SHOULDER: Primary | ICD-10-CM

## 2024-02-22 DIAGNOSIS — M67.911 TENDINOPATHY OF RIGHT ROTATOR CUFF: ICD-10-CM

## 2024-02-22 PROCEDURE — 20611 DRAIN/INJ JOINT/BURSA W/US: CPT | Mod: RT,S$GLB,, | Performed by: STUDENT IN AN ORGANIZED HEALTH CARE EDUCATION/TRAINING PROGRAM

## 2024-02-22 PROCEDURE — 1160F RVW MEDS BY RX/DR IN RCRD: CPT | Mod: CPTII,S$GLB,, | Performed by: STUDENT IN AN ORGANIZED HEALTH CARE EDUCATION/TRAINING PROGRAM

## 2024-02-22 PROCEDURE — 73030 X-RAY EXAM OF SHOULDER: CPT | Mod: 26,RT,, | Performed by: RADIOLOGY

## 2024-02-22 PROCEDURE — 73030 X-RAY EXAM OF SHOULDER: CPT | Mod: TC,RT

## 2024-02-22 PROCEDURE — 99214 OFFICE O/P EST MOD 30 MIN: CPT | Mod: 25,S$GLB,, | Performed by: STUDENT IN AN ORGANIZED HEALTH CARE EDUCATION/TRAINING PROGRAM

## 2024-02-22 PROCEDURE — 1159F MED LIST DOCD IN RCRD: CPT | Mod: CPTII,S$GLB,, | Performed by: STUDENT IN AN ORGANIZED HEALTH CARE EDUCATION/TRAINING PROGRAM

## 2024-02-22 PROCEDURE — 99999 PR PBB SHADOW E&M-EST. PATIENT-LVL III: CPT | Mod: PBBFAC,,, | Performed by: STUDENT IN AN ORGANIZED HEALTH CARE EDUCATION/TRAINING PROGRAM

## 2024-02-22 RX ORDER — BETAMETHASONE SODIUM PHOSPHATE AND BETAMETHASONE ACETATE 3; 3 MG/ML; MG/ML
6 INJECTION, SUSPENSION INTRA-ARTICULAR; INTRALESIONAL; INTRAMUSCULAR; SOFT TISSUE
Status: DISCONTINUED | OUTPATIENT
Start: 2024-02-22 | End: 2024-02-22 | Stop reason: HOSPADM

## 2024-02-22 RX ADMIN — BETAMETHASONE SODIUM PHOSPHATE AND BETAMETHASONE ACETATE 6 MG: 3; 3 INJECTION, SUSPENSION INTRA-ARTICULAR; INTRALESIONAL; INTRAMUSCULAR; SOFT TISSUE at 02:02

## 2024-02-22 NOTE — PROGRESS NOTES
Patient ID: Jonathan Barnes  YOB: 1980  MRN: [id number]    Chief Complaint: Pain of the Right Shoulder    Referred By: Self    Occupation:       History of Present Illness: Jonathan Barnes is a right-hand dominant 43 y.o. male who presents today with Pain of the Right Shoulder    He complains of right lateral shoulder pain that began in 2024 without inciting event.  He has intermittent aching pain and weakness especially with reaching out or above shoulder height or any strenuous pushing or lifting.  He has self treated with Aleve, ice and activity modification.  He has had a similar bout of this type of pain in his left shoulder many years ago which was treated with cortisone injection and PT.  He feels comfortable with remembering the exercises from those sessions.    Past Medical History:   Past Medical History:   Diagnosis Date    Gout     Hypertension 2022     Past Surgical History:   Procedure Laterality Date    ADENOIDECTOMY      REPAIR OF TYMPANIC MEMBRANE USING PATCH      TONSILLECTOMY       Family History   Problem Relation Age of Onset    Nephrolithiasis Mother     Heart disease Mother     Arthritis Mother     Gout Father     Arthritis Father     Cancer Maternal Grandfather      Social History     Socioeconomic History    Marital status:    Tobacco Use    Smoking status: Former     Current packs/day: 0.00     Types: Cigarettes     Quit date: 2000     Years since quittin.7    Smokeless tobacco: Never   Substance and Sexual Activity    Alcohol use: Yes     Alcohol/week: 8.0 standard drinks of alcohol     Types: 8 Cans of beer per week    Drug use: Never    Sexual activity: Yes     Partners: Female     Birth control/protection: None     Medication List with Changes/Refills   Current Medications    ALLOPURINOL (ZYLOPRIM) 300 MG TABLET    Take 300 mg by mouth once daily.    COLCHICINE (COLCRYS) 0.6 MG TABLET    TAKE 2 TABLETS BY MOUTH IN THE  MORNING AND 1 TABLET IN THE EVENING ON DAY 1 THEN 1 TABLET DAILY ON DAYS 2 THROUGH 5    FLUOXETINE 40 MG CAPSULE    Take 40 mg by mouth once daily.    LORATADINE (CLARITIN) 10 MG TABLET        OFLOXACIN (OCUFLOX) 0.3 % OPHTHALMIC SOLUTION    Place 1 drop into both eyes 4 (four) times daily.    ROPINIROLE (REQUIP) 1 MG TABLET    Take 1 tablet (1 mg total) by mouth every evening.    TESTOSTERONE IM    Inject into the muscle twice a week.     Review of patient's allergies indicates:   Allergen Reactions    Penicillins        Physical Exam:   There is no height or weight on file to calculate BMI.    Physical Exam  Detailed MSK exam:     Right Shoulder:  Inspection:  No swelling, erythema or ecchymosis.   Palpation: No tenderness to palpation   Range of motion: 165 deg Flexion         45 deg External Rotation in Adduction         IR to Lumbar  Strength:  5/5 Abduction    5/5 External Rotation in Adduction    5/5 Internal Rotation   Special Tests: Positive Brownlee-Harish    Positive Neer's    Negative Speed's    Equivocal [place]'s  Equivocal Empty/Full Can  + Painful Arc   N/V Exam:  Radial: Normal motor (EPL/thumbs up)              Normal sensory (dorsal hand)   Median: Normal motor (FPL/A-OK)      Normal sensory (thumb)   Ulnar:  Normal motor (Interossei/scissors-spread)     Normal sensory (5th finger)   LABC: Normal sensory (lateral forearm)   MABC: Normal sensory (medial forearm)   MC: Normal motor (elbow flexion)   Axillary: Normal motor/sensory (deltoid)  Normal radial and ulnar pulses, warm and well perfused with capillary refill < 2 sec       Imaging:  X-ray Shoulder 2 or More Views Right  Narrative: EXAMINATION:  XR SHOULDER COMPLETE 2 OR MORE VIEWS RIGHT    CLINICAL HISTORY:  Pain in right shoulder    TECHNIQUE:  Two or three views of the right shoulder were performed.    COMPARISON:  None    FINDINGS:  No acute osseous abnormality or significant arthritic change.  5 mm round osseous density projecting within  right axillary soft tissues.  Lung parenchyma clear.  Impression: As above    Electronically signed by: Will Espinoza MD  Date:    02/22/2024  Time:    13:26    Relevant imaging results were reviewed and interpreted by me and per my read:  Normal appearing radiographs of the right shoulder.  No significant degenerative changes.  Normal alignment.  No fractures or other acute abnormalities.    This was discussed with the patient and / or family today.     Patient Instructions   Assessment:  Jonathan Barnes is a 43 y.o. male with a chief complaint of Pain of the Right Shoulder    Encounter Diagnoses   Name Primary?    Impingement syndrome of right shoulder Yes    Tendinopathy of right rotator cuff     Right shoulder pain, unspecified chronicity       Plan:  XR reviewed - no abnormalities noted, no significant degenerative changes  A1c 4.9%, 1.2, GFR > 60, LFTs WNL, uric acid 3.9  The patient's history, clinical exam, and imaging findings are consistent with right shoulder subacromial impingement and rotator cuff tendinopathy.  No evidence to suggest a larger high-grade rotator cuff tear or other injury.  We have reviewed the natural history of this disorder and discussed the diagnosis, treatment options, and prognosis in detail.    Pain that has not responded with over-the-counter anti-inflammatories thus far.    Proceed with right subacromial bursa corticosteroid injection today.    Proper protocols after the injection included: no submerging pools, baths tubs, or hot tubs for 24 hr.  Showering is okay today.  Side effects of the corticosteroid injection can include elevated blood glucose levels and blood pressures, so if you are taking medications for these, please monitor closely, and contact your PCP if any issues.  Red flag symptoms include fever, chills, nausea, vomiting, red, warm, tender joint at the area of injection.  If you are noticing these symptoms, they may be indicative of an infection, and please  seek medical care immediately, either by calling our clinic or going to the emergency room.  Continue with home exercises under the direction of your wife, who has a physical therapist, focusing on rotator cuff strengthening, and overall range of motion of the shoulder  If not significantly improving over the next couple of weeks, please contact our clinic, as obtaining an MRI would likely be the next best step.    Follow-up:  As needed or sooner if there are any problems between now and then.    Thank you for choosing [facility] and Dr. Brian Henderson for your orthopedic & sports medicine care. It is our goal to provide you with exceptional care that will help keep you healthy, active, and get you back in the game.    Please do not hesitate to reach out to us via email, phone, or MyChart with any questions, concerns, or feedback.    If you are experiencing pain/discomfort ,or have questions after 5pm and would like to be connected to the [facility] on-call team, please call this number and specify which Sports Medicine provider is treating you: [phone]      A copy of today's visit note has been sent to the referring provider.           Brian Henderson MD  Primary Care Sports Medicine    Disclaimer: This note was prepared using a voice recognition system and is likely to have sound alike errors within the text.

## 2024-02-22 NOTE — PATIENT INSTRUCTIONS
Assessment:  Jonathan Barnes is a 43 y.o. male with a chief complaint of Pain of the Right Shoulder    Encounter Diagnoses   Name Primary?    Impingement syndrome of right shoulder Yes    Tendinopathy of right rotator cuff     Right shoulder pain, unspecified chronicity       Plan:  XR reviewed - no abnormalities noted, no significant degenerative changes  A1c 4.9%, 1.2, GFR > 60, LFTs WNL, uric acid 3.9  The patient's history, clinical exam, and imaging findings are consistent with right shoulder subacromial impingement and rotator cuff tendinopathy.  No evidence to suggest a larger high-grade rotator cuff tear or other injury.  We have reviewed the natural history of this disorder and discussed the diagnosis, treatment options, and prognosis in detail.    Pain that has not responded with over-the-counter anti-inflammatories thus far.    Proceed with right subacromial bursa corticosteroid injection today.    Proper protocols after the injection included: no submerging pools, baths tubs, or hot tubs for 24 hr.  Showering is okay today.  Side effects of the corticosteroid injection can include elevated blood glucose levels and blood pressures, so if you are taking medications for these, please monitor closely, and contact your PCP if any issues.  Red flag symptoms include fever, chills, nausea, vomiting, red, warm, tender joint at the area of injection.  If you are noticing these symptoms, they may be indicative of an infection, and please seek medical care immediately, either by calling our clinic or going to the emergency room.  Continue with home exercises under the direction of your wife, who has a physical therapist, focusing on rotator cuff strengthening, and overall range of motion of the shoulder  If not significantly improving over the next couple of weeks, please contact our clinic, as obtaining an MRI would likely be the next best step.    Follow-up:  As needed or sooner if there are any problems between  now and then.    Thank you for choosing [facility] and Dr. Brian Henderson for your orthopedic & sports medicine care. It is our goal to provide you with exceptional care that will help keep you healthy, active, and get you back in the game.    Please do not hesitate to reach out to us via email, phone, or MyChart with any questions, concerns, or feedback.    If you are experiencing pain/discomfort ,or have questions after 5pm and would like to be connected to the [facility] on-call team, please call this number and specify which Sports Medicine provider is treating you: [phone]

## 2024-02-22 NOTE — PROCEDURES
Large Joint Aspiration/Injection: R subacromial bursa    Date/Time: 2/22/2024 2:00 PM    Performed by: Brian Henderson MD  Authorized by: Brian Henderson MD    Consent Done?:  Yes (Verbal)  Indications:  Pain and diagnostic evaluation  Site marked: the procedure site was marked    Timeout: prior to procedure the correct patient, procedure, and site was verified      Local anesthesia used?: Yes    Anesthesia:  Local infiltration  Local anesthetic:  Topical anesthetic, bupivacaine 0.5% without epinephrine and lidocaine 1% without epinephrine  Anesthetic total (ml):  4      Details:  Needle Size:  21 G  Ultrasonic Guidance for needle placement?: Yes    Images are saved and documented.  Approach:  Lateral  Location:  Shoulder  Site:  R subacromial bursa  Medications:  6 mg betamethasone acetate-betamethasone sodium phosphate 6 mg/mL  Patient tolerance:  Patient tolerated the procedure well with no immediate complications     Ultrasound guidance was used for needle localization. Images were saved and stored for documentation. The appropriate structures were visualized. Dynamic visualization of the needle was continuous throughout the procedures and maintained good position.     We discussed the proper protocols after the injection such as no submerging pools, baths tubs, or hot tubs for 24 hr.  Showering is okay today.  We also discussed that blood sugars can be elevated after an injection and asked patient to properly check their sugars over the next few days and contact their PCP if there are any concerns.  We discussed red flags such as fevers, chills, red, warm, tender joint at the area of injection to please seek medical care immediately.

## 2024-02-28 DIAGNOSIS — G25.81 RESTLESS LEG SYNDROME: ICD-10-CM

## 2024-02-28 RX ORDER — ROPINIROLE 1 MG/1
1 TABLET, FILM COATED ORAL NIGHTLY
Qty: 90 TABLET | Refills: 0 | Status: SHIPPED | OUTPATIENT
Start: 2024-02-28 | End: 2024-06-03

## 2024-02-28 NOTE — TELEPHONE ENCOUNTER
No care due was identified.  [facility] Embedded Care Due Messages. Reference number: [id number].   2/28/2024 6:21:58 AM CST

## 2024-03-07 NOTE — TELEPHONE ENCOUNTER
No care due was identified.  [facility] Embedded Care Due Messages. Reference number: [id number].   11/16/2023 7:40:20 PM CST   [de-identified] : 5 year old male presents for follow up evaluation of dysphonia. 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 Last seen in with resolved BIJU, but severely breathy voice and 80% adenoid obstruction.  Mom states voice continues to improve. States voice is soft, but normal.  No pain or SOB with phonating. Denies loss of voice and difficulty swallowing. Using Flonase BID and Albuterol PRN.  Denies snoring and chronic nasal congestion. Treated for PNA 1 month ago and right ear infection shortly after.  Snoring has improved since last visit. Recent right ear infection, but no difficulty hearing. CT Chest w/wo IV contrast 2/28/24 IMPRESSION: Left lower lobe linear atelectasis versus scarring from prior infection. No pneumonia. No evidence of vascular abnormality or pulmonary sequestration.

## 2024-05-29 DIAGNOSIS — G25.81 RESTLESS LEG SYNDROME: ICD-10-CM

## 2024-05-29 NOTE — TELEPHONE ENCOUNTER
Care Due:                  Date            Visit Type   Department     Provider  --------------------------------------------------------------------------------                                ESTABLISHED                              PATIENT -    [facility] INTERNAL  Last Visit: 02-      VIRTUAL      MEDICINE       Eliud Bay                              ADMIT                               REVIEW EXEC   [facility] INTERNAL  Next Visit: 07-      CHECK        MEDICINE       Eliud Bay                                                            Last  Test          Frequency    Reason                     Performed    Due Date  --------------------------------------------------------------------------------    Cr..........  12 months..  colchicine...............  07- 07-    Uric Acid...  12 months..  colchicine...............  07- 07-    [facility] Embedded Care Due Messages. Reference number: [id number].   5/29/2024 12:19:30 AM CDT

## 2024-06-01 DIAGNOSIS — G25.81 RESTLESS LEG SYNDROME: ICD-10-CM

## 2024-06-01 RX ORDER — ROPINIROLE 1 MG/1
1 TABLET, FILM COATED ORAL NIGHTLY
Qty: 90 TABLET | Refills: 0 | Status: CANCELLED | OUTPATIENT
Start: 2024-06-01 | End: 2024-08-30

## 2024-06-01 NOTE — TELEPHONE ENCOUNTER
No care due was identified.  [facility] Embedded Care Due Messages. Reference number: [id number].   6/01/2024 12:12:07 AM CDT

## 2024-06-03 RX ORDER — ROPINIROLE 1 MG/1
1 TABLET, FILM COATED ORAL NIGHTLY
Qty: 30 TABLET | Refills: 2 | Status: SHIPPED | OUTPATIENT
Start: 2024-06-03

## 2024-07-12 ENCOUNTER — CLINICAL SUPPORT (OUTPATIENT)
Dept: INTERNAL MEDICINE | Facility: CLINIC | Age: 44
End: 2024-07-12
Payer: COMMERCIAL

## 2024-07-12 ENCOUNTER — OFFICE VISIT (OUTPATIENT)
Dept: INTERNAL MEDICINE | Facility: CLINIC | Age: 44
End: 2024-07-12

## 2024-07-12 ENCOUNTER — HOSPITAL ENCOUNTER (OUTPATIENT)
Dept: CARDIOLOGY | Facility: HOSPITAL | Age: 44
Discharge: HOME | End: 2024-07-12

## 2024-07-12 DIAGNOSIS — G25.81 RLS (RESTLESS LEGS SYNDROME): ICD-10-CM

## 2024-07-12 DIAGNOSIS — Z00.00 ROUTINE GENERAL MEDICAL EXAMINATION AT A HEALTH CARE FACILITY: Primary | ICD-10-CM

## 2024-07-12 DIAGNOSIS — Z00.00 ROUTINE GENERAL MEDICAL EXAMINATION AT A HEALTH CARE FACILITY: ICD-10-CM

## 2024-07-12 DIAGNOSIS — K21.9 GASTROESOPHAGEAL REFLUX DISEASE, UNSPECIFIED WHETHER ESOPHAGITIS PRESENT: ICD-10-CM

## 2024-07-12 DIAGNOSIS — M1A.0790 IDIOPATHIC CHRONIC GOUT OF FOOT WITHOUT TOPHUS, UNSPECIFIED LATERALITY: ICD-10-CM

## 2024-07-12 DIAGNOSIS — F41.1 GAD (GENERALIZED ANXIETY DISORDER): ICD-10-CM

## 2024-07-12 LAB
ALBUMIN SERPL BCP-MCNC: 3.6 G/DL (ref 3.5–5.2)
ALP SERPL-CCNC: 69 U/L (ref 55–135)
ALT SERPL W/O P-5'-P-CCNC: 38 U/L (ref 10–44)
ANION GAP SERPL CALC-SCNC: 10 MMOL/L (ref 8–16)
AST SERPL-CCNC: 33 U/L (ref 10–40)
BILIRUB SERPL-MCNC: 0.5 MG/DL (ref 0.1–1)
BILIRUB UR QL STRIP: NEGATIVE
BUN SERPL-MCNC: 17 MG/DL (ref 6–20)
CALCIUM SERPL-MCNC: 8.9 MG/DL (ref 8.7–10.5)
CHLORIDE SERPL-SCNC: 107 MMOL/L (ref 95–110)
CHOLEST SERPL-MCNC: 206 MG/DL (ref 120–199)
CHOLEST/HDLC SERPL: 3.6 {RATIO} (ref 2–5)
CLARITY UR: CLEAR
CO2 SERPL-SCNC: 25 MMOL/L (ref 23–29)
COLOR UR: YELLOW
CREAT SERPL-MCNC: 1.4 MG/DL (ref 0.5–1.4)
ERYTHROCYTE [DISTWIDTH] IN BLOOD BY AUTOMATED COUNT: 14.6 % (ref 11.5–14.5)
EST. GFR  (NO RACE VARIABLE): >60 ML/MIN/1.73 M^2
GLUCOSE SERPL-MCNC: 90 MG/DL (ref 70–110)
GLUCOSE UR QL STRIP: NEGATIVE
HCT VFR BLD AUTO: 48.9 % (ref 40–54)
HDLC SERPL-MCNC: 57 MG/DL (ref 40–75)
HDLC SERPL: 27.7 % (ref 20–50)
HGB BLD-MCNC: 16.3 G/DL (ref 14–18)
HGB UR QL STRIP: NEGATIVE
KETONES UR QL STRIP: NEGATIVE
LDLC SERPL CALC-MCNC: 131.6 MG/DL (ref 63–159)
LEUKOCYTE ESTERASE UR QL STRIP: NEGATIVE
MCH RBC QN AUTO: 29.8 PG (ref 27–31)
MCHC RBC AUTO-ENTMCNC: 33.3 G/DL (ref 32–36)
MCV RBC AUTO: 89 FL (ref 82–98)
NITRITE UR QL STRIP: NEGATIVE
NONHDLC SERPL-MCNC: 149 MG/DL
OHS QRS DURATION: 86 MS
OHS QTC CALCULATION: 364 MS
PH UR STRIP: 8 [PH] (ref 5–8)
PLATELET # BLD AUTO: 208 K/UL (ref 150–450)
PMV BLD AUTO: 10.1 FL (ref 9.2–12.9)
POTASSIUM SERPL-SCNC: 4.4 MMOL/L (ref 3.5–5.1)
PROT SERPL-MCNC: 6.7 G/DL (ref 6–8.4)
PROT UR QL STRIP: NEGATIVE
RBC # BLD AUTO: 5.47 M/UL (ref 4.6–6.2)
SODIUM SERPL-SCNC: 142 MMOL/L (ref 136–145)
SP GR UR STRIP: 1.02 (ref 1–1.03)
TRIGL SERPL-MCNC: 87 MG/DL (ref 30–150)
TSH SERPL DL<=0.005 MIU/L-ACNC: 1.19 UIU/ML (ref 0.4–4)
URN SPEC COLLECT METH UR: NORMAL
WBC # BLD AUTO: 4.84 K/UL (ref 3.9–12.7)

## 2024-07-12 PROCEDURE — 81003 URINALYSIS AUTO W/O SCOPE: CPT | Performed by: FAMILY MEDICINE

## 2024-07-12 PROCEDURE — 80061 LIPID PANEL: CPT | Performed by: FAMILY MEDICINE

## 2024-07-12 PROCEDURE — 85027 COMPLETE CBC AUTOMATED: CPT | Performed by: FAMILY MEDICINE

## 2024-07-12 PROCEDURE — 83036 HEMOGLOBIN GLYCOSYLATED A1C: CPT | Performed by: FAMILY MEDICINE

## 2024-07-12 PROCEDURE — 82728 ASSAY OF FERRITIN: CPT | Performed by: FAMILY MEDICINE

## 2024-07-12 PROCEDURE — 93010 ELECTROCARDIOGRAM REPORT: CPT | Mod: ,,, | Performed by: INTERNAL MEDICINE

## 2024-07-12 PROCEDURE — 93005 ELECTROCARDIOGRAM TRACING: CPT

## 2024-07-12 PROCEDURE — 99999 PR PBB SHADOW E&M-EST. PATIENT-LVL II: CPT | Mod: PBBFAC,,, | Performed by: FAMILY MEDICINE

## 2024-07-12 PROCEDURE — 80053 COMPREHEN METABOLIC PANEL: CPT | Performed by: FAMILY MEDICINE

## 2024-07-12 PROCEDURE — 84153 ASSAY OF PSA TOTAL: CPT | Performed by: FAMILY MEDICINE

## 2024-07-12 PROCEDURE — 84443 ASSAY THYROID STIM HORMONE: CPT | Performed by: FAMILY MEDICINE

## 2024-07-12 RX ORDER — FERROUS SULFATE 325(65) MG
325 TABLET ORAL DAILY
Qty: 30 TABLET | Refills: 4 | Status: SHIPPED | OUTPATIENT
Start: 2024-07-12

## 2024-07-12 RX ORDER — PANTOPRAZOLE SODIUM 40 MG/1
40 TABLET, DELAYED RELEASE ORAL EVERY MORNING
COMMUNITY
Start: 2024-06-29

## 2024-07-12 NOTE — PROGRESS NOTES
Subjective:       Patient ID: Jonathan Barnes is a 43 y.o. male.    Chief Complaint: Executive Health (Pt present today for an executive health exam  )    43-year-old male patient with Patient Active Problem List:     Idiopathic chronic gout of foot without tophus     JOSSUE (generalized anxiety disorder)     RLS (restless legs syndrome)     Gastroesophageal reflux disease  Here for executive health physical  Patient has been taking his medications regularly and reports that he does not have any gout exacerbations  Has been taking ropinirole but continues to have restless leg symptoms and reports that he felt better when he was taking iron supplements, requesting refill  Stays physically active with exercise by lifting weights        Review of Systems   Constitutional:  Negative for appetite change and fatigue.   Eyes:  Negative for visual disturbance.   Respiratory:  Negative for shortness of breath.    Cardiovascular:  Negative for chest pain, palpitations and leg swelling.   Gastrointestinal:  Negative for abdominal pain, nausea and vomiting.   Musculoskeletal:  Positive for myalgias. Negative for arthralgias.   Skin:  Negative for rash.   Neurological:  Negative for headaches.   Psychiatric/Behavioral:  Negative for sleep disturbance.          There were no vitals taken for this visit.  Objective:      Physical Exam  Constitutional:       Appearance: He is well-developed.   HENT:      Head: Normocephalic and atraumatic.   Cardiovascular:      Rate and Rhythm: Normal rate and regular rhythm.      Heart sounds: Normal heart sounds. No murmur heard.  Pulmonary:      Effort: Pulmonary effort is normal.      Breath sounds: Normal breath sounds. No wheezing.   Abdominal:      General: Bowel sounds are normal.      Palpations: Abdomen is soft.      Tenderness: There is no abdominal tenderness.   Musculoskeletal:         General: No tenderness.   Skin:     General: Skin is warm and dry.      Findings: No rash.    Neurological:      Mental Status: He is alert and oriented to person, place, and time.   Psychiatric:         Mood and Affect: Mood normal.           Assessment/Plan:   1. Routine general medical examination at a health care facility  Vital signs stable today.  Clinical exam stable  Continue lifestyle modifications with low-fat and low-cholesterol diet and exercise 30 minutes daily  Reviewed recent labs which looks stable including EKG    2. JOSSUE (generalized anxiety disorder)  Stable on Prozac 40 mg daily    3. Idiopathic chronic gout of foot without tophus, unspecified laterality  Clinically doing well on allopurinol 300 mg daily and colchicine as needed    4. Gastroesophageal reflux disease, unspecified whether esophagitis present  Clinically doing well on pantoprazole 40 mg daily    5. RLS (restless legs syndrome)  -     ferrous sulfate (FEOSOL) 325 mg (65 mg iron) Tab tablet; Take 1 tablet (325 mg total) by mouth once daily.  Dispense: 30 tablet; Refill: 4  -     Ferritin; Future; Expected date: 07/12/2024  Will check iron labs and refill given on iron supplements to be taken once daily  Continue ropinirole  Drink adequate fluids    Patient to consider tetanus vaccine if not received in the last 10 years

## 2024-07-13 LAB
COMPLEXED PSA SERPL-MCNC: 0.37 NG/ML (ref 0–4)
ESTIMATED AVG GLUCOSE: 100 MG/DL (ref 68–131)
FERRITIN SERPL-MCNC: 18 NG/ML (ref 20–300)
HBA1C MFR BLD: 5.1 % (ref 4–5.6)

## 2024-07-27 DIAGNOSIS — M1A.0790 IDIOPATHIC CHRONIC GOUT, UNSPECIFIED ANKLE AND FOOT, WITHOUT TOPHUS (TOPHI): ICD-10-CM

## 2024-07-27 NOTE — TELEPHONE ENCOUNTER
No care due was identified.  [facility] Embedded Care Due Messages. Reference number: [id number].   7/27/2024 12:52:52 AM CDT

## 2024-07-29 RX ORDER — ALLOPURINOL 300 MG/1
300 TABLET ORAL
Qty: 90 TABLET | Refills: 3 | Status: SHIPPED | OUTPATIENT
Start: 2024-07-29

## 2024-07-29 NOTE — TELEPHONE ENCOUNTER
Refill Routing Note   Medication(s) are not appropriate for processing by [facility] for the following reason(s):        Required labs outdated  No active prescription written by provider    [facility] action(s):  Defer             Appointments  past 12m or future 3m with PCP    Date Provider   Last Visit   7/12/2024 Eliud Bay MD   Next Visit   Visit date not found Eliud Bay MD   ED visits in past 90 days: 0        Note composed:9:57 PM 07/28/2024

## 2024-08-16 ENCOUNTER — OFFICE VISIT (OUTPATIENT)
Dept: SPORTS MEDICINE | Facility: CLINIC | Age: 44
End: 2024-08-16
Payer: COMMERCIAL

## 2024-08-16 VITALS — WEIGHT: 184.06 LBS | RESPIRATION RATE: 17 BRPM | BODY MASS INDEX: 26.35 KG/M2 | HEIGHT: 70 IN

## 2024-08-16 DIAGNOSIS — M67.911 TENDINOPATHY OF RIGHT ROTATOR CUFF: ICD-10-CM

## 2024-08-16 DIAGNOSIS — M25.511 RIGHT SHOULDER PAIN, UNSPECIFIED CHRONICITY: ICD-10-CM

## 2024-08-16 DIAGNOSIS — M75.41 IMPINGEMENT SYNDROME OF RIGHT SHOULDER: Primary | ICD-10-CM

## 2024-08-16 PROCEDURE — 99999 PR PBB SHADOW E&M-EST. PATIENT-LVL III: CPT | Mod: PBBFAC,,, | Performed by: STUDENT IN AN ORGANIZED HEALTH CARE EDUCATION/TRAINING PROGRAM

## 2024-08-16 RX ORDER — BETAMETHASONE SODIUM PHOSPHATE AND BETAMETHASONE ACETATE 3; 3 MG/ML; MG/ML
6 INJECTION, SUSPENSION INTRA-ARTICULAR; INTRALESIONAL; INTRAMUSCULAR; SOFT TISSUE
Status: DISCONTINUED | OUTPATIENT
Start: 2024-08-16 | End: 2024-08-16 | Stop reason: HOSPADM

## 2024-08-16 RX ADMIN — BETAMETHASONE SODIUM PHOSPHATE AND BETAMETHASONE ACETATE 6 MG: 3; 3 INJECTION, SUSPENSION INTRA-ARTICULAR; INTRALESIONAL; INTRAMUSCULAR; SOFT TISSUE at 11:08

## 2024-08-16 NOTE — PROGRESS NOTES
Patient ID: Jonathan Barnes  YOB: 1980  MRN: [id number]    Chief Complaint: Pain of the Right Shoulder    Referred By: Self    Occupation:       History of Present Illness: Jonathan Barnes is a right-hand dominant 43 y.o. male who presents today with Pain of the Right Shoulder    He was initially evaluated in our office on 2/22/24 with right lateral shoulder pain.  He was diagnosed with right shoulder impingement and rotator cuff tendinopathy and treated with subacromial CSI and HEP.    Today, he reports that he had excellent relief from his pain for about 4 months after the injection.  He has been consistently working on his HEP with his wife who is a PT.  The pain feels the same as it did prior to the injection.      HPI 2/22/24:  He complains of right lateral shoulder pain that began in January 2024 without inciting event.  He has intermittent aching pain and weakness especially with reaching out or above shoulder height or any strenuous pushing or lifting.  He has self treated with Aleve, ice and activity modification.  He has had a similar bout of this type of pain in his left shoulder many years ago which was treated with cortisone injection and PT.  He feels comfortable with remembering the exercises from those sessions.    Past Medical History:   Past Medical History:   Diagnosis Date    Gout     Hypertension 4/28/2022     Past Surgical History:   Procedure Laterality Date    ADENOIDECTOMY      REPAIR OF TYMPANIC MEMBRANE USING PATCH      TONSILLECTOMY       Family History   Problem Relation Name Age of Onset    Nephrolithiasis Mother Anjana Barnes     Heart disease Mother Anjana Barnes     Arthritis Mother Anjana Barnes     Gout Father Sha Barnes     Arthritis Father Sha Barnes     Cancer Maternal Grandfather Devante Pruitt      Social History     Socioeconomic History    Marital status:    Tobacco Use    Smoking status: Former     Current packs/day: 0.00      Types: Cigarettes     Quit date: 2000     Years since quittin.2    Smokeless tobacco: Never   Substance and Sexual Activity    Alcohol use: Yes     Alcohol/week: 8.0 standard drinks of alcohol     Types: 8 Cans of beer per week    Drug use: Never    Sexual activity: Yes     Partners: Female     Birth control/protection: None     Social Determinants of Health     Financial Resource Strain: Low Risk  (2024)    Overall Financial Resource Strain (CARDIA)     Difficulty of Paying Living Expenses: Not hard at all   Food Insecurity: No Food Insecurity (2024)    Hunger Vital Sign     Worried About Running Out of Food in the Last Year: Never true     Ran Out of Food in the Last Year: Never true   Physical Activity: Sufficiently Active (2024)    Exercise Vital Sign     Days of Exercise per Week: 6 days     Minutes of Exercise per Session: 40 min   Stress: No Stress Concern Present (2024)    [employer] [place] [employer] - Occupational Stress Questionnaire     Feeling of Stress : Not at all   Housing Stability: Unknown (2024)    Housing Stability Vital Sign     Unable to Pay for Housing in the Last Year: No     Medication List with Changes/Refills   Current Medications    ALLOPURINOL (ZYLOPRIM) 300 MG TABLET    TAKE 1 TABLET BY MOUTH EVERY DAY    COLCHICINE (COLCRYS) 0.6 MG TABLET    TAKE 2 TABLETS BY MOUTH IN THE MORNING AND 1 TABLET IN THE EVENING ON DAY 1 THEN 1 TABLET DAILY ON DAYS 2 THROUGH 5    FERROUS SULFATE (FEOSOL) 325 MG (65 MG IRON) TAB TABLET    Take 1 tablet (325 mg total) by mouth once daily.    FLUOXETINE 40 MG CAPSULE    Take 40 mg by mouth once daily.    LORATADINE (CLARITIN) 10 MG TABLET        PANTOPRAZOLE (PROTONIX) 40 MG TABLET    Take 40 mg by mouth every morning.    ROPINIROLE (REQUIP) 1 MG TABLET    TAKE 1 TABLET BY MOUTH EVERY EVENING.    TESTOSTERONE IM    Inject into the muscle twice a week.     Review of patient's allergies indicates:   Allergen Reactions     Penicillins        Physical Exam:   Body mass index is 26.41 kg/m².    Physical Exam  Detailed MSK exam:     Right Shoulder:  Inspection:  No swelling, erythema or ecchymosis.   Palpation: No tenderness to palpation   Range of motion: 165 deg Flexion         45 deg External Rotation in Adduction         IR to Lumbar  Strength:  5/5 Abduction    5/5 External Rotation in Adduction    5/5 Internal Rotation   Special Tests: Positive Brownlee-Harish    Positive Neer's    Negative Speed's    Equivocal [place]'s  Equivocal Empty/Full Can  + Painful Arc   N/V Exam:  Radial: Normal motor (EPL/thumbs up)              Normal sensory (dorsal hand)   Median: Normal motor (FPL/A-OK)      Normal sensory (thumb)   Ulnar:  Normal motor (Interossei/scissors-spread)     Normal sensory (5th finger)   LABC: Normal sensory (lateral forearm)   MABC: Normal sensory (medial forearm)   MC: Normal motor (elbow flexion)   Axillary: Normal motor/sensory (deltoid)  Normal radial and ulnar pulses, warm and well perfused with capillary refill < 2 sec       Imaging:  XR Results:  Results for orders placed during the hospital encounter of 02/22/24    X-ray Shoulder 2 or More Views Right    Narrative  EXAMINATION:  XR SHOULDER COMPLETE 2 OR MORE VIEWS RIGHT    CLINICAL HISTORY:  Pain in right shoulder    TECHNIQUE:  Two or three views of the right shoulder were performed.    COMPARISON:  None    FINDINGS:  No acute osseous abnormality or significant arthritic change.  5 mm round osseous density projecting within right axillary soft tissues.  Lung parenchyma clear.    Impression  As above      Electronically signed by: Will Espinoza MD  Date:    02/22/2024  Time:    13:26      Patient Instructions   Assessment:  Jonathan Barnes is a 43 y.o. male with a chief complaint of Pain of the Right Shoulder    Encounter Diagnoses   Name Primary?    Impingement syndrome of right shoulder Yes    Tendinopathy of right rotator cuff     Right shoulder pain,  unspecified chronicity       Plan:  Patient with recurrence of right shoulder pain, with 4 months of relief with prior subacromial bursa corticosteroid injection.  Repeat subacromial bursa corticosteroid injection today.    Proper protocols after the injection included: no submerging pools, baths tubs, or hot tubs for 24 hr.  Showering is okay today.  Side effects of the corticosteroid injection can include elevated blood glucose levels and blood pressures, so if you are taking medications for these, please monitor closely, and contact your PCP if any issues.  Red flag symptoms include fever, chills, nausea, vomiting, red, warm, tender joint at the area of injection.  If you are noticing these symptoms, they may be indicative of an infection, and please seek medical care immediately, either by calling our clinic or going to the emergency room.  Continue home exercise rehab program.    If pain recurs after this injection, would recommend MRI for next step in evaluation, to try to identify underlying cause of recurrent pain and dysfunction.     Follow-up:  As needed or sooner if there are any problems between now and then.    Thank you for choosing [facility] and Dr. Brian Henderson for your orthopedic & sports medicine care. It is our goal to provide you with exceptional care that will help keep you healthy, active, and get you back in the game.    Please do not hesitate to reach out to us via email, phone, or MyChart with any questions, concerns, or feedback.    If you are experiencing pain/discomfort ,or have questions after 5pm and would like to be connected to the [facility] on-call team, please call this number and specify which Sports Medicine provider is treating you: [phone]       A copy of today's visit note has been sent to the referring provider.           Brian Henderson MD  Primary Care Sports Medicine    Disclaimer: This note was prepared  using a voice recognition system and is likely to have sound alike errors within the text.

## 2024-08-16 NOTE — PROCEDURES
Large Joint Aspiration/Injection: R subacromial bursa    Date/Time: 8/16/2024 11:40 AM    Performed by: Brian Henderson MD  Authorized by: Brian Henderson MD    Consent Done?:  Yes (Verbal)  Indications:  Pain  Site marked: the procedure site was marked    Timeout: prior to procedure the correct patient, procedure, and site was verified      Local anesthesia used?: Yes    Anesthesia:  Local infiltration  Local anesthetic:  Topical anesthetic, bupivacaine 0.5% without epinephrine and lidocaine 1% without epinephrine  Anesthetic total (ml):  4      Details:  Needle Size:  21 G  Ultrasonic Guidance for needle placement?: Yes    Images are saved and documented.  Approach:  Lateral  Location:  Shoulder  Site:  R subacromial bursa  Medications:  6 mg betamethasone acetate-betamethasone sodium phosphate 6 mg/mL  Patient tolerance:  Patient tolerated the procedure well with no immediate complications     Ultrasound guidance was used for needle localization. Images were saved and stored for documentation. The appropriate structures were visualized. Dynamic visualization of the needle was continuous throughout the procedures and maintained good position.     We discussed the proper protocols after the injection such as no submerging pools, baths tubs, or hot tubs for 24 hr.  Showering is okay today.  We also discussed that blood sugars can be elevated after an injection and asked patient to properly check their sugars over the next few days and contact their PCP if there are any concerns.  We discussed red flags such as fevers, chills, red, warm, tender joint at the area of injection to please seek medical care immediately.

## 2024-08-16 NOTE — PATIENT INSTRUCTIONS
Assessment:  Jonathan Barnes is a 43 y.o. male with a chief complaint of Pain of the Right Shoulder    Encounter Diagnoses   Name Primary?    Impingement syndrome of right shoulder Yes    Tendinopathy of right rotator cuff     Right shoulder pain, unspecified chronicity       Plan:  Patient with recurrence of right shoulder pain, with 4 months of relief with prior subacromial bursa corticosteroid injection.  Repeat subacromial bursa corticosteroid injection today.    Proper protocols after the injection included: no submerging pools, baths tubs, or hot tubs for 24 hr.  Showering is okay today.  Side effects of the corticosteroid injection can include elevated blood glucose levels and blood pressures, so if you are taking medications for these, please monitor closely, and contact your PCP if any issues.  Red flag symptoms include fever, chills, nausea, vomiting, red, warm, tender joint at the area of injection.  If you are noticing these symptoms, they may be indicative of an infection, and please seek medical care immediately, either by calling our clinic or going to the emergency room.  Continue home exercise rehab program.    If pain recurs after this injection, would recommend MRI for next step in evaluation, to try to identify underlying cause of recurrent pain and dysfunction.     Follow-up:  As needed or sooner if there are any problems between now and then.    Thank you for choosing [facility] and Dr. Brian Henderson for your orthopedic & sports medicine care. It is our goal to provide you with exceptional care that will help keep you healthy, active, and get you back in the game.    Please do not hesitate to reach out to us via email, phone, or MyChart with any questions, concerns, or feedback.    If you are experiencing pain/discomfort ,or have questions after 5pm and would like to be connected to the [facility] on-call team, please call this number  and specify which Sports Medicine provider is treating you: [phone]

## 2024-08-26 DIAGNOSIS — G25.81 RESTLESS LEG SYNDROME: ICD-10-CM

## 2024-08-26 NOTE — TELEPHONE ENCOUNTER
Care Due:                  Date            Visit Type   Department     Provider  --------------------------------------------------------------------------------                                ADMIT                               REVIEW EXEC   [facility] INTERNAL  Rachna Macario  Last Visit: 07-      CHECK        MEDICINE       Bee[facility]  Next Visit: None Scheduled  None         None Found                                                            Last  Test          Frequency    Reason                     Performed    Due Date  --------------------------------------------------------------------------------    Uric Acid...  12 months..  allopurinoL, colchicine..  07- 07-    [facility] Embedded Care Due Messages. Reference number: [id number].   8/26/2024 12:10:05 AM CDT

## 2024-08-26 NOTE — TELEPHONE ENCOUNTER
Refill Routing Note   Medication(s) are not appropriate for processing by [facility] for the following reason(s):        Outside of protocol    [facility] action(s):  Route     Requires labs : Yes             Appointments  past 12m or future 3m with PCP    Date Provider   Last Visit   7/12/2024 Eliud Bay MD   Next Visit   Visit date not found Eliud Bay MD   ED visits in past 90 days: 0        Note composed:3:43 PM 08/26/2024

## 2024-08-27 RX ORDER — ROPINIROLE 1 MG/1
1 TABLET, FILM COATED ORAL NIGHTLY
Qty: 30 TABLET | Refills: 11 | Status: SHIPPED | OUTPATIENT
Start: 2024-08-27 | End: 2025-08-27

## 2024-10-29 ENCOUNTER — TELEPHONE (OUTPATIENT)
Dept: SPORTS MEDICINE | Facility: CLINIC | Age: 44
End: 2024-10-29
Payer: COMMERCIAL

## 2024-10-29 DIAGNOSIS — M25.511 CHRONIC RIGHT SHOULDER PAIN: Primary | ICD-10-CM

## 2024-10-29 DIAGNOSIS — G89.29 CHRONIC RIGHT SHOULDER PAIN: Primary | ICD-10-CM

## 2024-11-05 ENCOUNTER — OFFICE VISIT (OUTPATIENT)
Dept: INTERNAL MEDICINE | Facility: CLINIC | Age: 44
End: 2024-11-05
Payer: COMMERCIAL

## 2024-11-05 ENCOUNTER — TELEPHONE (OUTPATIENT)
Dept: INTERNAL MEDICINE | Facility: CLINIC | Age: 44
End: 2024-11-05

## 2024-11-05 DIAGNOSIS — M1A.0710 IDIOPATHIC CHRONIC GOUT OF RIGHT FOOT WITHOUT TOPHUS: ICD-10-CM

## 2024-11-05 DIAGNOSIS — G25.81 RLS (RESTLESS LEGS SYNDROME): Primary | ICD-10-CM

## 2024-11-05 DIAGNOSIS — M79.642 LEFT HAND PAIN: ICD-10-CM

## 2024-11-05 DIAGNOSIS — M1A.0720 IDIOPATHIC CHRONIC GOUT OF LEFT FOOT WITHOUT TOPHUS: ICD-10-CM

## 2024-11-05 PROCEDURE — 3044F HG A1C LEVEL LT 7.0%: CPT | Mod: CPTII,95,, | Performed by: FAMILY MEDICINE

## 2024-11-05 PROCEDURE — G2211 COMPLEX E/M VISIT ADD ON: HCPCS | Mod: 95,,, | Performed by: FAMILY MEDICINE

## 2024-11-05 PROCEDURE — 1159F MED LIST DOCD IN RCRD: CPT | Mod: CPTII,95,, | Performed by: FAMILY MEDICINE

## 2024-11-05 PROCEDURE — 99214 OFFICE O/P EST MOD 30 MIN: CPT | Mod: 95,,, | Performed by: FAMILY MEDICINE

## 2024-11-05 PROCEDURE — 1160F RVW MEDS BY RX/DR IN RCRD: CPT | Mod: CPTII,95,, | Performed by: FAMILY MEDICINE

## 2024-11-05 RX ORDER — FERROUS SULFATE 325(65) MG
325 TABLET ORAL DAILY
Qty: 30 TABLET | Refills: 4 | Status: SHIPPED | OUTPATIENT
Start: 2024-11-05

## 2024-11-05 RX ORDER — ROPINIROLE 2 MG/1
2 TABLET, FILM COATED ORAL NIGHTLY
Qty: 90 TABLET | Refills: 0 | Status: SHIPPED | OUTPATIENT
Start: 2024-11-05 | End: 2025-02-03

## 2024-11-05 RX ORDER — ALLOPURINOL 300 MG/1
300 TABLET ORAL DAILY
Qty: 90 TABLET | Refills: 3 | Status: SHIPPED | OUTPATIENT
Start: 2024-11-05

## 2024-11-05 NOTE — PROGRESS NOTES
Subjective:   Patient ID: Jonathan Barnes is a 44 y.o. male.  Chief Complaint:  No chief complaint on file.    The patient location is: Work  The chief complaint leading to consultation is:  Restless legs syndrome and Hand pain    Visit type: audiovisual    Face to Face time with patient: 15 minutes  20 minutes of total time spent on the encounter, which includes face to face time and non-face to face time preparing to see the patient (eg, review of tests), Obtaining and/or reviewing separately obtained history, Documenting clinical information in the electronic or other health record, Independently interpreting results (not separately reported) and communicating results to the patient/family/caregiver, or Care coordination (not separately reported).     Each patient to whom he or she provides medical services by telemedicine is:  (1) informed of the relationship between the physician and patient and the respective role of any other health care provider with respect to management of the patient; and (2) notified that he or she may decline to receive medical services by telemedicine and may withdraw from such care at any time.    Presents for follow-up on restless legs syndrome   Had executive health evaluation July 2024  Labs stable except ferritin level was low   Restarted iron supplement 325 mcg daily and continue on Requip 1 mg daily   Unfortunately medication not as effective in past   Over the past 7 days has taken 2 tablets nightly with significant improvement in symptoms   Would like a prescription for Requip 2 mg dose     Also has history of gout   Uric acid level not done this year, but normal previous 2 years   On allopurinol 300 mg daily  Reports compliance   Gout attacks usually in feet, but now having pain in the 2nd and 3rd digits of his left hand  Swelling, redness, warmth   Comes and goes   Feels like gout despite compliance with allopurinol  No previous autoimmune testing on file   No imaging of  hand on    Finally needs prescriptions sent to his new mail-order pharmacy      Review of Systems   Constitutional:  Negative for activity change and unexpected weight change.   HENT:  Negative for hearing loss, rhinorrhea and trouble swallowing.    Eyes:  Negative for discharge and visual disturbance.   Respiratory:  Negative for chest tightness and wheezing.    Cardiovascular:  Negative for chest pain and palpitations.   Gastrointestinal:  Negative for blood in stool, constipation, diarrhea and vomiting.   Endocrine: Negative for polydipsia and polyuria.   Genitourinary:  Negative for difficulty urinating, hematuria and urgency.   Musculoskeletal:  Positive for arthralgias and joint swelling. Negative for myalgias and neck pain.   Skin:  Negative for rash.   Neurological:  Negative for weakness and headaches.   Psychiatric/Behavioral:  Positive for sleep disturbance. Negative for confusion and dysphoric mood.      Objective:     Physical Exam  Constitutional:       Appearance: Normal appearance.   Psychiatric:         Mood and Affect: Mood and affect normal.       Assessment:       ICD-10-CM ICD-9-CM   1. RLS (restless legs syndrome)  G25.81 333.94   2. Left hand pain  M79.642 729.5   3. Idiopathic chronic gout of left foot without tophus  M1A.0720 274.02   4. Idiopathic chronic gout of right foot without tophus  M1A.0710 274.02     Plan:   RLS (restless legs syndrome)  -     ferrous sulfate (FEOSOL) 325 mg (65 mg iron) Tab tablet; Take 1 tablet (325 mg total) by mouth once daily.  Dispense: 30 tablet; Refill: 4  -     rOPINIRole (REQUIP) 2 MG tablet; Take 1 tablet (2 mg total) by mouth every evening.  Dispense: 90 tablet; Refill: 0  Symptoms not adequately controlled   Continue iron 325 mg daily   Increase Requip 2 mg daily  As needed could increase Requip 3 mg daily   If higher doses not effective, need referral to sleep specialists     Left hand pain  -     Sedimentation rate; Future; Expected date:  11/05/2024  -     C-reactive protein; Future; Expected date: 11/05/2024  -     MARLO; Future; Expected date: 11/05/2024  -     Rheumatoid factor; Future; Expected date: 11/05/2024  -     Cyclic citrul peptide antibody, IgG; Future; Expected date: 11/05/2024  -     Sjogrens syndrome-A extractable nuclear antibody; Future; Expected date: 11/05/2024  -     X-Ray Hand 2 View Left; Future; Expected date: 11/05/2024  Based on history review of symptoms, unclear if gout or other cause   Check x-ray   Check autoimmune panel   Additional evaluation and treatment based on results     Idiopathic chronic gout of left foot without tophus  Idiopathic chronic gout of right foot without tophus  -     Uric Acid; Future; Expected date: 11/05/2024  -     allopurinoL (ZYLOPRIM) 300 MG tablet; Take 1 tablet (300 mg total) by mouth once daily.  Dispense: 90 tablet; Refill: 3  Check uric acid level   If normal, continue allopurinol 300 mg daily  If elevated, increase allopurinol 600 mg daily    Patient expresses agreement understanding to the above plan     Return to clinic 9 months for executive Health evaluation or sooner as needed     Visit today included increased complexity associated with managing the longitudinal care of the patient due to the serious and/or complex managed problem(s) listed above.

## 2024-11-05 NOTE — TELEPHONE ENCOUNTER
----- Message from Eliud Bay MD sent at 11/5/2024 12:57 PM CST -----  Regarding: Labs and Xray  Please schedule at the [place] location for November 29, 2024

## 2025-02-19 DIAGNOSIS — G25.81 RLS (RESTLESS LEGS SYNDROME): ICD-10-CM

## 2025-02-19 NOTE — TELEPHONE ENCOUNTER
Care Due:                  Date            Visit Type   Department     Provider  --------------------------------------------------------------------------------                                ESTABLISHED                              PATIENT -    [facility]  Last Visit: 11-      [place]      [facility]IC[facility]       Eliud Bay  Next Visit: None Scheduled  None         None Found                                                            Last  Test          Frequency    Reason                     Performed    Due Date  --------------------------------------------------------------------------------    Uric Acid...  12 months..  allopurinoL, colchicine..  07- 07-    [facility] Embedded Care Due Messages. Reference number: [id number].   2/19/2025 2:01:17 PM CST

## 2025-02-20 RX ORDER — ROPINIROLE 2 MG/1
2 TABLET, FILM COATED ORAL NIGHTLY
Qty: 90 TABLET | Refills: 3 | Status: SHIPPED | OUTPATIENT
Start: 2025-02-20

## 2025-02-20 NOTE — TELEPHONE ENCOUNTER
Refill Routing Note   Medication(s) are not appropriate for processing by [facility] for the following reason(s):        Outside of protocol    [facility] action(s):  Route               Appointments  past 12m or future 3m with PCP    Date Provider   Last Visit   11/5/2024 Eliud Bay MD   Next Visit   Visit date not found Eliud Bay MD   ED visits in past 90 days: 0        Note composed:9:28 AM 02/20/2025

## 2025-09-03 RX ORDER — ROPINIROLE 1 MG/1
1 TABLET, FILM COATED ORAL NIGHTLY
Qty: 30 TABLET | Refills: 11 | OUTPATIENT
Start: 2025-09-03